# Patient Record
Sex: FEMALE | Race: OTHER | URBAN - METROPOLITAN AREA
[De-identification: names, ages, dates, MRNs, and addresses within clinical notes are randomized per-mention and may not be internally consistent; named-entity substitution may affect disease eponyms.]

---

## 2024-05-21 ENCOUNTER — HOSPITAL ENCOUNTER (OUTPATIENT)
Age: 29
Discharge: HOME | End: 2024-05-21

## 2024-05-21 VITALS
RESPIRATION RATE: 18 BRPM | OXYGEN SATURATION: 100 % | HEART RATE: 108 BPM | TEMPERATURE: 99 F | DIASTOLIC BLOOD PRESSURE: 71 MMHG | SYSTOLIC BLOOD PRESSURE: 110 MMHG

## 2024-05-21 DIAGNOSIS — J02.0 STREP PHARYNGITIS: Primary | ICD-10-CM

## 2024-05-21 LAB — S PYO AG THROAT QL: POSITIVE

## 2024-05-21 PROCEDURE — 87880 STREP A ASSAY W/OPTIC: CPT | Performed by: PHYSICIAN ASSISTANT

## 2024-05-21 PROCEDURE — 99203 OFFICE O/P NEW LOW 30 MIN: CPT | Performed by: PHYSICIAN ASSISTANT

## 2024-05-21 RX ORDER — AMOXICILLIN 875 MG/1
875 TABLET, COATED ORAL 2 TIMES DAILY
Qty: 20 TABLET | Refills: 0 | Status: SHIPPED | OUTPATIENT
Start: 2024-05-21 | End: 2024-05-31

## 2024-05-21 NOTE — ED INITIAL ASSESSMENT (HPI)
Pt c/o fever, throat discomfort, and fatigue, which started this morning. Pt vomited x1. Denies nausea at this time. At home covid test negative. History of intestinal surgeries during childhood

## 2024-05-21 NOTE — ED PROVIDER NOTES
Patient Seen in: [facility]      History     Chief Complaint   Patient presents with    Fever    Sore Throat     Stated Complaint: Abdominal Pain    Subjective:   HPI    29-year-old female presenting for evaluation of fever/chills, sore throat, vomiting.  History obtained through use of : Patient states symptom onset this a.m.  She had episode of nausea and vomiting but denies abdominal pain currently. No known sick contact. She did a home covid test which was negative.     Objective:   No pertinent past medical history.            No pertinent past surgical history.              No pertinent social history.            Review of Systems    Positive for stated complaint: Abdominal Pain  Other systems are as noted in HPI.  Constitutional and vital signs reviewed.      All other systems reviewed and negative except as noted above.    Physical Exam     ED Triage Vitals [05/21/24 1415]   /71   Pulse 115   Resp 18   Temp 99.2 °F (37.3 °C)   Temp src Temporal   SpO2 100 %   O2 Device None (Room air)       Current Vitals:   Vital Signs  BP: 110/71  Pulse: 108  Resp: 18  Temp: 99.2 °F (37.3 °C)  Temp src: Temporal    Oxygen Therapy  SpO2: 100 %  O2 Device: None (Room air)            Physical Exam  Vitals and nursing note reviewed.   Constitutional:       General: She is not in acute distress.  HENT:      Head: Normocephalic and atraumatic.      Right Ear: External ear normal.      Left Ear: External ear normal.      Nose: Nose normal.      Mouth/Throat:      Mouth: Mucous membranes are moist.      Pharynx: Uvula midline. Posterior oropharyngeal erythema present.      Tonsils: No tonsillar exudate. 0 on the right. 0 on the left.   Eyes:      Extraocular Movements: Extraocular movements intact.      Pupils: Pupils are equal, round, and reactive to light.   Cardiovascular:      Rate and Rhythm: Normal rate.   Pulmonary:      Effort: Pulmonary effort is normal.   Abdominal:      General:  Abdomen is flat.   Musculoskeletal:         General: Normal range of motion.      Cervical back: Normal range of motion.   Skin:     General: Skin is warm.   Neurological:      General: No focal deficit present.      Mental Status: She is alert and oriented to person, place, and time.   Psychiatric:         Mood and Affect: Mood normal.         Behavior: Behavior normal.               ED Course     Labs Reviewed   POCT RAPID STREP - Abnormal; Notable for the following components:       Result Value    POCT Rapid Strep Positive (*)     All other components within normal limits        29-year-old female presenting for evaluation of fever/chills, body aches, sore throat.  Patient also with 1 episode of nausea and vomiting.  Posterior oropharynx erythematous.   Ddx-viral pharyngitis, strep pharyngitis, viral illness  Patient initial heart rate 110s.  Improved during provider evaluation.  She appears in no distress.  Strep test is positive    Abdomen is non-tender and non-distended and pt has no c/o pain at this time.   Amoxicillin rx'd. Supportive care and anticipatory guidance.              MDM                                         Medical Decision Making      Disposition and Plan     Clinical Impression:  1. Strep pharyngitis         Disposition:  Discharge  5/21/2024  2:44 pm    Follow-up:  Norma Preciado MD  [address] [place] [address] [id number] [phone]          Roberta Arias MD  [address] [place] [address]  [phone]  [facility] [address]  [phone]                Medications Prescribed:  Discharge Medication List as of 5/21/2024  2:48 PM        START taking these medications    Details   amoxicillin 875 MG Oral Tab Take 1 tablet (875 mg total) by mouth 2 (two) times daily for 10 days., Normal, Disp-20 tablet, R-0

## 2025-03-28 ENCOUNTER — APPOINTMENT (OUTPATIENT)
Dept: ULTRASOUND IMAGING | Facility: HOSPITAL | Age: 30
End: 2025-03-28
Payer: COMMERCIAL

## 2025-03-28 ENCOUNTER — APPOINTMENT (OUTPATIENT)
Dept: CT IMAGING | Facility: HOSPITAL | Age: 30
End: 2025-03-28
Payer: COMMERCIAL

## 2025-03-28 ENCOUNTER — HOSPITAL ENCOUNTER (OUTPATIENT)
Age: 30
Discharge: HOME | End: 2025-03-28
Payer: COMMERCIAL

## 2025-03-28 VITALS
SYSTOLIC BLOOD PRESSURE: 125 MMHG | DIASTOLIC BLOOD PRESSURE: 79 MMHG | TEMPERATURE: 98 F | OXYGEN SATURATION: 99 % | HEART RATE: 94 BPM | RESPIRATION RATE: 18 BRPM

## 2025-03-28 DIAGNOSIS — N83.9 LESION OF LEFT OVARY: ICD-10-CM

## 2025-03-28 DIAGNOSIS — N70.11 HYDROSALPINX: ICD-10-CM

## 2025-03-28 DIAGNOSIS — K21.00 GASTROESOPHAGEAL REFLUX DISEASE WITH ESOPHAGITIS WITHOUT HEMORRHAGE: Primary | ICD-10-CM

## 2025-03-28 DIAGNOSIS — R10.32 LLQ PAIN: ICD-10-CM

## 2025-03-28 LAB
#MXD IC: 0.4 X10ˆ3/UL (ref 0.1–1)
B-HCG UR QL: NEGATIVE
BILIRUB UR QL STRIP: NEGATIVE
BUN BLD-MCNC: 7 MG/DL (ref 7–18)
CHLORIDE BLD-SCNC: 106 MMOL/L (ref 98–112)
CLARITY UR: CLEAR
CO2 BLD-SCNC: 20 MMOL/L (ref 21–32)
COLOR UR: YELLOW
CREAT BLD-MCNC: 0.6 MG/DL
EGFRCR SERPLBLD CKD-EPI 2021: 125 ML/MIN/1.73M2 (ref 60–?)
GLUCOSE BLD-MCNC: 116 MG/DL (ref 70–99)
GLUCOSE UR STRIP-MCNC: NEGATIVE MG/DL
HCT VFR BLD AUTO: 41.6 %
HCT VFR BLD CALC: 45 %
HGB BLD-MCNC: 14.1 G/DL
HGB UR QL STRIP: NEGATIVE
ISTAT IONIZED CALCIUM FOR CHEM 8: 1.2 MMOL/L (ref 1.12–1.32)
KETONES UR STRIP-MCNC: NEGATIVE MG/DL
LEUKOCYTE ESTERASE UR QL STRIP: NEGATIVE
LYMPHOCYTES # BLD AUTO: 1.8 X10ˆ3/UL (ref 1–4)
LYMPHOCYTES NFR BLD AUTO: 26.2 %
MCH RBC QN AUTO: 29.4 PG (ref 26–34)
MCHC RBC AUTO-ENTMCNC: 33.9 G/DL (ref 31–37)
MCV RBC AUTO: 86.7 FL (ref 80–100)
MIXED CELL %: 6.5 %
NEUTROPHILS # BLD AUTO: 4.7 X10ˆ3/UL (ref 1.5–7.7)
NEUTROPHILS NFR BLD AUTO: 67.3 %
NITRITE UR QL STRIP: NEGATIVE
PH UR STRIP: 8.5 [PH]
PLATELET # BLD AUTO: 457 X10ˆ3/UL (ref 150–450)
POTASSIUM BLD-SCNC: 3.7 MMOL/L (ref 3.6–5.1)
PROT UR STRIP-MCNC: NEGATIVE MG/DL
RBC # BLD AUTO: 4.8 X10ˆ6/UL
SODIUM BLD-SCNC: 138 MMOL/L (ref 136–145)
SP GR UR STRIP: 1.01
UROBILINOGEN UR STRIP-ACNC: <2 MG/DL
WBC # BLD AUTO: 6.9 X10ˆ3/UL (ref 4–11)

## 2025-03-28 PROCEDURE — 76856 US EXAM PELVIC COMPLETE: CPT

## 2025-03-28 PROCEDURE — 99214 OFFICE O/P EST MOD 30 MIN: CPT

## 2025-03-28 PROCEDURE — 93975 VASCULAR STUDY: CPT

## 2025-03-28 PROCEDURE — 76830 TRANSVAGINAL US NON-OB: CPT

## 2025-03-28 PROCEDURE — 81025 URINE PREGNANCY TEST: CPT

## 2025-03-28 PROCEDURE — 81002 URINALYSIS NONAUTO W/O SCOPE: CPT

## 2025-03-28 PROCEDURE — 74177 CT ABD & PELVIS W/CONTRAST: CPT

## 2025-03-28 PROCEDURE — 85025 COMPLETE CBC W/AUTO DIFF WBC: CPT

## 2025-03-28 PROCEDURE — 80047 BASIC METABLC PNL IONIZED CA: CPT

## 2025-03-28 RX ORDER — LIDOCAINE HYDROCHLORIDE 20 MG/ML
10 SOLUTION OROPHARYNGEAL ONCE
Status: COMPLETED | OUTPATIENT
Start: 2025-03-28 | End: 2025-03-28

## 2025-03-28 RX ORDER — MAGNESIUM HYDROXIDE/ALUMINUM HYDROXICE/SIMETHICONE 120; 1200; 1200 MG/30ML; MG/30ML; MG/30ML
30 SUSPENSION ORAL ONCE
Status: COMPLETED | OUTPATIENT
Start: 2025-03-28 | End: 2025-03-28

## 2025-03-28 RX ORDER — DICYCLOMINE HYDROCHLORIDE 10 MG/5ML
20 SOLUTION ORAL ONCE
Status: COMPLETED | OUTPATIENT
Start: 2025-03-28 | End: 2025-03-28

## 2025-03-28 RX ORDER — FAMOTIDINE 20 MG/1
20 TABLET, FILM COATED ORAL 2 TIMES DAILY PRN
Qty: 30 TABLET | Refills: 0 | Status: SHIPPED | OUTPATIENT
Start: 2025-03-28 | End: 2025-04-27

## 2025-03-28 NOTE — DISCHARGE INSTRUCTIONS
Shante síntomas son compatibles con reflujo.  Por favor, siga miriam dieta blanda y evite las comidas picantes.  Le envié miriam receta de Pepcid para el dolor.  Por favor, programe miriam kieran con el gastroenterólogo, andrei se indicó.    Si presenta un empeoramiento del dolor, vómitos, diarrea, fiebre, bar en las heces o cualquier otra molestia preocupante, acuda a urgencias.    De lo contrario, consulte con larry médico de cabecera.    Tiene miriam lesión en el ovario sandhya y acumulación de líquido en la trompa de Falopio. Estos son aspectos que deben ser evaluados por un ginecólogo. Por favor, programe miriam kieran con el ginecólogo, andrei se indicó.    También debe iniciar miriam consulta con un médico de atención primaria.    Si presenta un empeoramiento caitlin del dolor o cualquier otra molestia preocupante, acuda a urgencias.        Your symptoms are consistent with reflux.    Please eat a bland diet and stay away from eating spicy foods.   I sent a prescription for pepcid for the pain.    Please make an appointment to see the GI specialist as discussed.    If you develop any worsening pain, vomiting, diarrhea, fever, blood in the stool or any other concerning complaints that they go to the emergency department.    Otherwise follow-up with your PCP.    You have a lesion on your left ovary and a collection of fluid in the fallopian tube.  These are things that need to be evaluated by a gynecologist.  Please make an appointment with the gynecologist as discussed.     You should also establish care with a primary care doctor.     If you develop any acutely worsening pain or any other worsening or concerning complaints please go to the emergency room.

## 2025-03-28 NOTE — ED PROVIDER NOTES
Patient Seen in: [facility]      History     Chief Complaint   Patient presents with    Epigastric Pain     Stated Complaint: Dizziness; Stomach/back pain; Fever; Difficuilty breathing  Subjective:   Yina is a 30 year old female presenting to the immediate care complaining of epigastric pain for the past 3 days.  Patient states that she has not had any nausea, vomiting, diarrhea or constipation.  Patient has not had a measured fever but has had some subjective chills over the past couple of days.  Denies any urinary symptoms.  Denies any vaginal bleeding or discharge.  Patient states that she has also had some mild anxiety symptoms over the past few days.  States that 2 days ago she had some anxiety that was accompanied by minor palpitations and shortness of breath.  No chest pain.  Palpitations lasted only a short time and did not return.  She has not had any palpitations since.  She states that she has been eating and drinking well and is well-hydrated.  She denies any other concerns or complaints.        Objective:   No pertinent past medical history.          No pertinent past surgical history.            No pertinent social history.          Review of Systems    Positive for stated complaint: Epigastric Pain    Other systems are as noted in HPI.  Constitutional and vital signs reviewed.      All other systems reviewed and negative except as noted above.    Physical Exam     ED Triage Vitals [03/28/25 1119]   /79   Pulse 94   Resp 18   Temp 98.3 °F (36.8 °C)   Temp src Oral   SpO2 99 %   O2 Device None (Room air)     Current:/79   Pulse 94   Temp 98.3 °F (36.8 °C) (Oral)   Resp 18   LMP 05/21/2024   SpO2 99%     Physical Exam  Vitals and nursing note reviewed.   Constitutional:       General: She is not in acute distress.     Appearance: Normal appearance. She is not ill-appearing, toxic-appearing or diaphoretic.   HENT:      Head: Normocephalic.   Cardiovascular:      Rate and  Rhythm: Normal rate.   Pulmonary:      Effort: Pulmonary effort is normal. No respiratory distress.   Abdominal:      General: Abdomen is flat. A surgical scar is present. Bowel sounds are normal. There is no distension.      Palpations: Abdomen is soft. There is no mass.      Tenderness: There is abdominal tenderness in the epigastric area, suprapubic area and left lower quadrant. There is guarding. There is no right CVA tenderness, left CVA tenderness or rebound.      Hernia: No hernia is present.      Comments: Multiple surgical scars to lower abdomen   Musculoskeletal:         General: Normal range of motion.      Cervical back: Normal range of motion.   Skin:     General: Skin is warm and dry.      Capillary Refill: Capillary refill takes less than 2 seconds.   Neurological:      General: No focal deficit present.      Mental Status: She is alert and oriented to person, place, and time.   Psychiatric:         Mood and Affect: Mood normal.         Behavior: Behavior normal.         Thought Content: Thought content normal.         Judgment: Judgment normal.         ED Course   Radiology:    US PELVIS EV W DOPPLER (CPT=76856/45486/02346)   Final Result   PROCEDURE: US PELVIS EV W DOPPLER (CPT=76856/42597/70390)       COMPARISON: [facility], CT ABDOMEN + PELVIS (CONTRAST    ONLY) (CPT=74177), 3/28/2025, 1:38 PM.       INDICATIONS: Dizziness; stomach/back pain; fever; difficulty breathing.       TECHNIQUE: Pelvic ultrasound using transabdominal and transvaginal    technique.  A transvaginal scan was performed for improved tissue    characterization of the uterus, enhanced visualization of the endometrial    canal, and ovarian/adnexal evaluation.       FINDINGS:      UTERUS:       SIZE:  The uterus measures 9.8 x 3.9 x 5.9 cm.   ENDOMETRIUM: Endometrial thickness measures up to 2.7 cm. No fluid or mass    in the endometrial canal. On color Doppler interrogation, there is no    discernible  hypervascularity.   MYOMETRIUM: Normal echogenicity and homogeneous in echotexture without    masses.         OVARIES AND ADNEXA:    RIGHT:   The right ovary measures 3.4 x 2.7 x 2.5 cm and demonstrates a    1.6 x 1.5 x 1.3 cm mildly complex-appearing lesion. Additionally in the    right adnexal region, there is a tubular fluid-filled structure which    appears separate from the ovary. Color    Doppler flow is demonstrated. Biphasic waveforms are apparent on spectral    Doppler interrogation.   LEFT:   The left ovary (only seen on transabdominal imaging) measures 9.1    x 4.3 x 5.6 cm and demonstrates a multi-septated complex-appearing cystic    lesion measuring up to 6.8 x 4.1 x 4.3 cm. Flow is present on color    Doppler interrogation. Spectral    Doppler analysis reveals biphasic waveforms.       CUL-DE-SAC:   No free fluid or mass.     OTHER: Limited sonographic views of the bladder are grossly unremarkable.                            =====   CONCLUSION:    1. No evidence of ovarian torsion.       2. Complex multi-septated left ovarian lesion. Although this may be    functional/physiologic, gynecological follow-up and possible MRI of the    pelvis with and without contrast could be considered on an outpatient    nonemergent basis for further assessment.       3. Partially visualized mildly complex right ovarian cyst.         4. Additionally, there is suspected right hydrosalpinx.       5. Lesser incidental findings as above.               Dictated by (CST): Bao Lizama MD on 3/28/2025 at 3:56 PM        Finalized by (CST): aBo Lizama MD on 3/28/2025 at 4:00 PM               CT ABDOMEN+PELVIS(CONTRAST ONLY)(CPT=74177)   Final Result   PROCEDURE: CT ABDOMEN + PELVIS (CONTRAST ONLY) (CPT=74177)       COMPARISON: None.       INDICATIONS: LLQ tenderness, multiple intestinal surgeries.       TECHNIQUE: CT images of the abdomen and pelvis were obtained with    non-ionic intravenous contrast material.  Automated  exposure control for    dose reduction was used. Adjustment of the mA and/or kV was done based on    the patient's size. Use of iterative    reconstruction technique for dose reduction was used.  Dose information is    transmitted to the ACR (American College of Radiology) NRDR (National    Radiology Data Registry) which includes the Dose Index Registry.       FINDINGS:    LIVER: Diffuse low attenuation seen throughout the liver compatible with    fatty infiltration.   GALLBLADDER: Normal wall thickness.  No pericholecystic fluid.   BILIARY: No intra-or extrahepatic biliary ductal dilation.   SPLEEN: Unremarkable   PANCREAS: The pancreas enhances symmetrically. No ductal dilation.   ADRENALS: Unremarkable   KIDNEYS: The kidneys enhance symmetrically. There is no hydronephrosis.     AORTA/VASCULAR:   Normal.  No aneurysm or dissection.    RETROPERITONEUM: There are scattered subcentimeter nonspecific    retroperitoneal lymph nodes.   BOWEL:  Postoperative changes of a right hemicolectomy with an ileocolonic    anastomosis. There is no dilation or wall thickening.   MESENTERY: Normal.  No mass or hernia.      PELVIS: Multiloculated and septated cysts seen within the left ovary    measuring up to 3.5 x 5.3 cm. Bladder wall thickening likely due to under    distention.  Within the right lower pelvis, there is a tubular structure    measuring 4.7 x 2.4 by 4.1 cm without    adjacent inflammation.     BONES:   No significant bony lesion or fracture.   LUNG BASES: No visible pulmonary or pleural disease.                   =====   CONCLUSION:        Enlarged left ovary containing a 5.3 cm multi septated cyst.       Tubular structure within the right lower pelvis suspicious for    hydrosalpinx.       Pelvic ultrasound recommended for further evaluation.       No obstruction                Dictated by (CST): Markie Wynn MD on 3/28/2025 at 2:17 PM        Finalized by (CST): Markie Wynn MD on 3/28/2025 at 2:20 PM                  Labs Reviewed   POCT CBC - Abnormal; Notable for the following components:       Result Value    PLT .0 (*)     All other components within normal limits   POCT ISTAT CHEM8 CARTRIDGE - Abnormal; Notable for the following components:    ISTAT Glucose 116 (*)     ISTAT TCO2 20 (*)     All other components within normal limits   [facility] POCT URINALYSIS DIPSTICK - Abnormal; Notable for the following components:    PH, Urine 8.5 (*)     All other components within normal limits   POCT PREGNANCY URINE - Normal       MDM     Medical Decision Making  Differential diagnoses reflecting the complexity of care include but are not limited to colitis, diverticulitis, GERD, surgical adhesions.    Comorbidities that add complexity to management include: Hirschsprung's disease with multiple intestinal surgeries  History obtained by an independent source was from: Patient  Patient is well appearing, non-toxic and in no acute distress.  Vital signs are stable.     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  No vaginal discharge or bleeding.  Patient was given a GI cocktail in the immediate care with relief of the epigastric pain.  Will send a prescription for Pepcid.  Recommended that the patient make an appointment to follow-up with GI specialist.    Urine pregnancy test is negative.  Urine dip unremarkable.  Labs and CT were done.  CBC and BMP were both unremarkable.  CT scan showed a possible hydrosalpinx and recommended an ultrasound.  Pelvic ultrasound was done that also saw hydrosalpinx and a complicated left ovarian lesion.  Recommended Tylenol or Motrin for pain for now until she follows up with GYN.  I discussed all of these results with the patient with a  and recommended that she make an appointment to follow-up as an outpatient with gynecologist.  Also recommended that  the patient make an appointment to follow-up with a primary care doctor.   Recommended that if the patient develop any acutely worsening pain or any other worsening or concerning complaints that she should go to the emergency department.    ED precautions discussed.  Patient (guardian) advised to follow up with PCP in 2-3 days.  Patient (guardian) agrees with this plan of care.  Patient (guardian) verbalizes understanding of discharge instructions and plan of care.  Patient discussed with Dr. Myrick on the phone who agrees with this plan.  Entire visit was conducted with  #[id number], [id number], [id number]      Amount and/or Complexity of Data Reviewed  Labs: ordered. Decision-making details documented in ED Course.  Radiology: ordered. Decision-making details documented in ED Course.    Risk  OTC drugs.  Prescription drug management.        Disposition and Plan     Clinical Impression:  1. Gastroesophageal reflux disease with esophagitis without hemorrhage    2. LLQ pain    3. Hydrosalpinx    4. Lesion of left ovary         Disposition:  Discharge  3/28/2025  4:16 pm    Follow-up:  Alma Burnette APRN  [address] [facility] [address]  [facility] [address]  [phone]          Lorne Russell MD  [address] [facility] [address]  [facility] [address]  [phone]          Esau Llanes MD  [address] [place] [id number]  [phone]                Medications Prescribed:  Discharge Medication List as of 3/28/2025  4:16 PM        START taking these medications    Details   famotidine (PEPCID) 20 MG Oral Tab Take 1 tablet (20 mg total) by mouth 2 (two) times daily as needed for Heartburn., Normal, Disp-30 tablet, R-0

## 2025-04-01 ENCOUNTER — HOSPITAL ENCOUNTER (EMERGENCY)
Facility: HOSPITAL | Age: 30
Discharge: HOME | End: 2025-04-01
Attending: EMERGENCY MEDICINE
Payer: COMMERCIAL

## 2025-04-01 ENCOUNTER — APPOINTMENT (OUTPATIENT)
Dept: CT IMAGING | Facility: HOSPITAL | Age: 30
End: 2025-04-01
Attending: EMERGENCY MEDICINE
Payer: COMMERCIAL

## 2025-04-01 ENCOUNTER — HOSPITAL ENCOUNTER (OUTPATIENT)
Age: 30
Discharge: ANOTHER HOSPITAL | End: 2025-04-01
Payer: COMMERCIAL

## 2025-04-01 ENCOUNTER — APPOINTMENT (OUTPATIENT)
Dept: GENERAL RADIOLOGY | Facility: HOSPITAL | Age: 30
End: 2025-04-01
Attending: EMERGENCY MEDICINE
Payer: COMMERCIAL

## 2025-04-01 VITALS
DIASTOLIC BLOOD PRESSURE: 73 MMHG | HEART RATE: 84 BPM | RESPIRATION RATE: 18 BRPM | OXYGEN SATURATION: 99 % | SYSTOLIC BLOOD PRESSURE: 131 MMHG | TEMPERATURE: 98 F

## 2025-04-01 VITALS
TEMPERATURE: 98 F | BODY MASS INDEX: 29.74 KG/M2 | SYSTOLIC BLOOD PRESSURE: 111 MMHG | DIASTOLIC BLOOD PRESSURE: 91 MMHG | HEIGHT: 64.57 IN | OXYGEN SATURATION: 99 % | RESPIRATION RATE: 25 BRPM | WEIGHT: 176.38 LBS | HEART RATE: 88 BPM

## 2025-04-01 DIAGNOSIS — R10.13 EPIGASTRIC PAIN: Primary | ICD-10-CM

## 2025-04-01 DIAGNOSIS — R07.89 CHEST PRESSURE: Primary | ICD-10-CM

## 2025-04-01 DIAGNOSIS — R10.13 EPIGASTRIC PAIN: ICD-10-CM

## 2025-04-01 LAB
ALBUMIN SERPL-MCNC: 4.6 G/DL (ref 3.2–4.8)
ALBUMIN/GLOB SERPL: 1.8 {RATIO} (ref 1–2)
ALP LIVER SERPL-CCNC: 51 U/L
ALT SERPL-CCNC: 38 U/L
ANION GAP SERPL CALC-SCNC: 9 MMOL/L (ref 0–18)
AST SERPL-CCNC: 27 U/L (ref ?–34)
B-HCG UR QL: NEGATIVE
BASOPHILS # BLD AUTO: 0.03 X10(3) UL (ref 0–0.2)
BASOPHILS NFR BLD AUTO: 0.4 %
BILIRUB SERPL-MCNC: 0.6 MG/DL (ref 0.3–1.2)
BUN BLD-MCNC: 10 MG/DL (ref 9–23)
BUN/CREAT SERPL: 14.1 (ref 10–20)
CALCIUM BLD-MCNC: 9.1 MG/DL (ref 8.7–10.4)
CHLORIDE SERPL-SCNC: 105 MMOL/L (ref 98–112)
CO2 SERPL-SCNC: 25 MMOL/L (ref 21–32)
CREAT BLD-MCNC: 0.71 MG/DL
D DIMER PPP FEU-MCNC: 0.33 UG/ML FEU (ref ?–0.5)
DEPRECATED RDW RBC AUTO: 39.4 FL (ref 35.1–46.3)
EGFRCR SERPLBLD CKD-EPI 2021: 119 ML/MIN/1.73M2 (ref 60–?)
EOSINOPHIL # BLD AUTO: 0.08 X10(3) UL (ref 0–0.7)
EOSINOPHIL NFR BLD AUTO: 1 %
ERYTHROCYTE [DISTWIDTH] IN BLOOD BY AUTOMATED COUNT: 12.1 % (ref 11–15)
GLOBULIN PLAS-MCNC: 2.6 G/DL (ref 2–3.5)
GLUCOSE BLD-MCNC: 99 MG/DL (ref 70–99)
HCT VFR BLD AUTO: 40.5 %
HGB BLD-MCNC: 13.6 G/DL
IMM GRANULOCYTES # BLD AUTO: 0.03 X10(3) UL (ref 0–1)
IMM GRANULOCYTES NFR BLD: 0.4 %
LIPASE SERPL-CCNC: 37 U/L (ref 12–53)
LYMPHOCYTES # BLD AUTO: 1.99 X10(3) UL (ref 1–4)
LYMPHOCYTES NFR BLD AUTO: 25.3 %
MCH RBC QN AUTO: 30 PG (ref 26–34)
MCHC RBC AUTO-ENTMCNC: 33.6 G/DL (ref 31–37)
MCV RBC AUTO: 89.2 FL
MONOCYTES # BLD AUTO: 0.39 X10(3) UL (ref 0.1–1)
MONOCYTES NFR BLD AUTO: 5 %
NEUTROPHILS # BLD AUTO: 5.35 X10 (3) UL (ref 1.5–7.7)
NEUTROPHILS # BLD AUTO: 5.35 X10(3) UL (ref 1.5–7.7)
NEUTROPHILS NFR BLD AUTO: 67.9 %
OSMOLALITY SERPL CALC.SUM OF ELEC: 287 MOSM/KG (ref 275–295)
PLATELET # BLD AUTO: 429 10(3)UL (ref 150–450)
POTASSIUM SERPL-SCNC: 3.7 MMOL/L (ref 3.5–5.1)
PROT SERPL-MCNC: 7.2 G/DL (ref 5.7–8.2)
RBC # BLD AUTO: 4.54 X10(6)UL
SODIUM SERPL-SCNC: 139 MMOL/L (ref 136–145)
TROPONIN I SERPL HS-MCNC: <3 NG/L
WBC # BLD AUTO: 7.9 X10(3) UL (ref 4–11)

## 2025-04-01 PROCEDURE — 36415 COLL VENOUS BLD VENIPUNCTURE: CPT

## 2025-04-01 PROCEDURE — 99285 EMERGENCY DEPT VISIT HI MDM: CPT

## 2025-04-01 PROCEDURE — 71045 X-RAY EXAM CHEST 1 VIEW: CPT | Performed by: EMERGENCY MEDICINE

## 2025-04-01 PROCEDURE — 83690 ASSAY OF LIPASE: CPT | Performed by: EMERGENCY MEDICINE

## 2025-04-01 PROCEDURE — 93010 ELECTROCARDIOGRAM REPORT: CPT

## 2025-04-01 PROCEDURE — 84484 ASSAY OF TROPONIN QUANT: CPT | Performed by: EMERGENCY MEDICINE

## 2025-04-01 PROCEDURE — 99215 OFFICE O/P EST HI 40 MIN: CPT | Performed by: PHYSICIAN ASSISTANT

## 2025-04-01 PROCEDURE — 93005 ELECTROCARDIOGRAM TRACING: CPT

## 2025-04-01 PROCEDURE — 74177 CT ABD & PELVIS W/CONTRAST: CPT | Performed by: EMERGENCY MEDICINE

## 2025-04-01 PROCEDURE — 99284 EMERGENCY DEPT VISIT MOD MDM: CPT

## 2025-04-01 PROCEDURE — 81025 URINE PREGNANCY TEST: CPT

## 2025-04-01 PROCEDURE — 85379 FIBRIN DEGRADATION QUANT: CPT | Performed by: EMERGENCY MEDICINE

## 2025-04-01 PROCEDURE — 85025 COMPLETE CBC W/AUTO DIFF WBC: CPT | Performed by: EMERGENCY MEDICINE

## 2025-04-01 PROCEDURE — 80053 COMPREHEN METABOLIC PANEL: CPT | Performed by: EMERGENCY MEDICINE

## 2025-04-01 RX ORDER — OMEPRAZOLE 40 MG/1
40 CAPSULE, DELAYED RELEASE ORAL DAILY
Qty: 30 CAPSULE | Refills: 0 | Status: SHIPPED | OUTPATIENT
Start: 2025-04-01 | End: 2025-05-01

## 2025-04-01 NOTE — ED PROVIDER NOTES
Chief Complaint   Patient presents with    Abdominal Pain       HPI:     Yina Hollingsworth is a 30 year old female who presents for evaluation of epigastric pain over the last 4 days radiating into the mid upper back, notes seen through her department 3 days ago including blood work CT and ultrasound discharged home with H2 antagonist for supportive GERD.  Findings also included a septated cyst left ovary with normal waveforms.  Patient states pain has not been improving over this time more localized along the epigastric area.  Notes abdominal surgical history includes Hirschsprung's complications @ birth with revision surgery at age 7.  Notes bowel patterns and voiding normal.  Denies history of gallstones or pancreatitis.  Denies associated fevers chills headache dizziness neck pain chest pain shortness of breath vomiting diarrhea dysuria hematuria medic Juhi vaginal bleeding or discharge.      PFSH    PFS asessment screens reviewed and agree.  Nurses notes reviewed I agree with documentation.    History reviewed. No pertinent family history.  Family history reviewed with patient/caregiver and is not pertinent to presenting problem.  Social History     Socioeconomic History    Marital status: Single     Spouse name: Not on file    Number of children: Not on file    Years of education: Not on file    Highest education level: Not on file   Occupational History    Not on file   Tobacco Use    Smoking status: Unknown    Smokeless tobacco: Not on file   Substance and Sexual Activity    Alcohol use: Not on file    Drug use: Not on file    Sexual activity: Not on file   Other Topics Concern    Not on file   Social History Narrative    Not on file     Social Drivers of Health     Food Insecurity: Not on file   Transportation Needs: Not on file   Housing Stability: Not on file         ROS:   Positive for stated complaint: Abdominal pain.  All other systems reviewed and negative except as noted above.  Constitutional  and Vital Signs Reviewed.      Physical Exam:     Findings:    /73   Pulse 84   Temp 98.2 °F (36.8 °C) (Oral)   Resp 18   LMP 05/21/2024   SpO2 99%   GENERAL: well developed, well nourished, well hydrated, no distress  SKIN: good skin turgor, no obvious rashes  NECK: supple, no adenopathy  CARDIO: RRR without murmur  EXTREMITIES: no cyanosis or edema. PETE without difficulty  GI: Mild epigastric tenderness, negative Wright.  Negative Scotts.  Negative rebound.  No adnexal or CVA tenderness bilaterally.  Normal bowel sounds  HEAD: normocephalic, atraumatic  EYES: sclera non icteric bilateral, conjunctiva clear  EARS: TMs clear bilaterally. Canals clear.  NOSE: nasal turbinates: pink, normal mucosa  THROAT: clear, without exudates, uvula midline, and airway patent  LUNGS: clear to auscultation bilaterally; no rales, rhonchi, or wheezes  NEURO: No focal deficits  PSYCH: Alert and oriented x3.  Answering questions appropriately.  Mood appropriate.    MDM/Assessment/Plan:   Orders for this encounter:    No orders of the defined types were placed in this encounter.      Labs performed this visit:  No results found for this or any previous visit (from the past 10 hours).    MDM:  Patient instructed via translation and limitations our facility based on previous workup and assessment and examination today, patient agreeable to be further evaluated through the ER by patient preference versus outpatient referrals based on ongoing concerns and issues.  Dr. Tran notified, patient agrees with n.p.o. status by private vehicle until reassessment.    Diagnosis:    ICD-10-CM    1. Epigastric pain  R10.13           All results reviewed and discussed with patient.  See AVS for detailed discharge instructions for your condition today.    Follow Up with:  No follow-up provider specified.

## 2025-04-01 NOTE — ED INITIAL ASSESSMENT (HPI)
Pt reports sob and back pain that started last week. Pt states that the pain is getting worse today. Pt reports chest pressure. Pt reports no fever/N/V.

## 2025-04-01 NOTE — DISCHARGE INSTRUCTIONS
Take omeprazole as prescribed.  Follow-up with your primary physician.  Also follow-up with your gynecologist as discussed.  Return to the emergency department if increased pain, increased difficulty breathing, or other new symptoms develop.

## 2025-04-01 NOTE — ED PROVIDER NOTES
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  She reports intermittent symptoms of palpitations and chest pressure to her anterior chest with radiation to the left upper back.  She also reports some upper abdominal discomfort, nausea, and subjective fever.  She reports symptoms over the past week.  She initially presented to an immediate care today and was sent to the ED for evaluation.    Objective:     History reviewed. No pertinent past medical history.           Past Surgical History:   Procedure Laterality Date    Tonsillectomy                  Social History     Socioeconomic History    Marital status: Single   Tobacco Use    Smoking status: Unknown   Vaping Use    Vaping status: Some Days   Substance and Sexual Activity    Alcohol use: Yes     Comment: social    Drug use: Never                  Physical Exam     ED Triage Vitals [04/01/25 1322]   /80   Pulse 92   Resp 20   Temp 98.3 °F (36.8 °C)   Temp src Temporal   SpO2 100 %   O2 Device None (Room air)       Current Vitals:   Vital Signs  BP: 125/80  Pulse: 92  Resp: 20  Temp: 98.3 °F (36.8 °C)  Temp src: Temporal    Oxygen Therapy  SpO2: 100 %  O2 Device: None (Room air)        Physical Exam    General Appearance: alert, no distress  Eyes: pupils equal and round no pallor or injection  ENT, Mouth: mucous membranes moist  Respiratory: there are no retractions, lungs are clear to auscultation  Cardiovascular: regular rate and rhythm  Gastrointestinal:  abdomen is soft with mild tenderness across the upper abdomen, no  masses, bowel sounds normal  Neurological: Speech normal.  Moving extremities x 4.  Skin: warm and dry, no rashes.  Musculoskeletal: neck is supple non tender        Extremities are symmetrical, full range of motion no leg edema or tenderness noted.  Psychiatric: patient is oriented X 3, there is no agitation    ED Course     Labs Reviewed   COMP METABOLIC PANEL (14) - Normal   LIPASE - Normal   TROPONIN I HIGH SENSITIVITY - Normal   D-DIMER - Normal   POCT PREGNANCY URINE - Normal   CBC WITH DIFFERENTIAL WITH PLATELET   RAINBOW DRAW LAVENDER   RAINBOW DRAW LIGHT GREEN   UA HOLD     EKG    Rate, intervals and axes as noted on EKG Report.  Rate: 89  Rhythm: Sinus Rhythm  Axis: Normal  Reading: Normal EKG                     MDM      Chest x-ray was reviewed independently me.  No pneumonia or pneumothorax noted.  Lab, EKG, and CT results noted.  No acute findings to explain the patient's symptoms.  She does have an ovarian cyst noted on CT imaging which the patient is aware of and has a follow-up appointment scheduled with gynecology.  Will discharge home with a prescription for a PPI and recommendations to follow-up with her primary physician for further evaluation and treatment.  She is advised to return if worsening symptoms develop.        Medical Decision Making      Disposition and Plan     Clinical Impression:  1. Chest pressure    2. Epigastric pain         Disposition:  Discharge  4/1/2025  3:59 pm    Follow-up:  own physician    Follow up            Medications Prescribed:  Current Discharge Medication List        START taking these medications    Details   Omeprazole 40 MG Oral Capsule Delayed Release Take 1 capsule (40 mg total) by mouth daily.  Qty: 30 capsule, Refills: 0                 Supplementary Documentation:

## 2025-04-02 LAB
ATRIAL RATE: 89 BPM
P AXIS: 52 DEGREES
P-R INTERVAL: 156 MS
Q-T INTERVAL: 372 MS
QRS DURATION: 86 MS
QTC CALCULATION (BEZET): 452 MS
R AXIS: 13 DEGREES
T AXIS: 21 DEGREES
VENTRICULAR RATE: 89 BPM

## 2025-04-08 ENCOUNTER — OFFICE VISIT (OUTPATIENT)
Dept: OBGYN CLINIC | Facility: CLINIC | Age: 30
End: 2025-04-08

## 2025-04-08 ENCOUNTER — LAB ENCOUNTER (OUTPATIENT)
Dept: LAB | Age: 30
End: 2025-04-08
Attending: STUDENT IN AN ORGANIZED HEALTH CARE EDUCATION/TRAINING PROGRAM
Payer: COMMERCIAL

## 2025-04-08 VITALS
SYSTOLIC BLOOD PRESSURE: 110 MMHG | HEART RATE: 85 BPM | BODY MASS INDEX: 28 KG/M2 | DIASTOLIC BLOOD PRESSURE: 67 MMHG | WEIGHT: 164 LBS

## 2025-04-08 DIAGNOSIS — Z12.4 SCREENING FOR CERVICAL CANCER: ICD-10-CM

## 2025-04-08 DIAGNOSIS — N83.202 LEFT OVARIAN CYST: ICD-10-CM

## 2025-04-08 DIAGNOSIS — Z01.419 ENCOUNTER FOR WELL WOMAN EXAM: Primary | ICD-10-CM

## 2025-04-08 DIAGNOSIS — Z11.3 SCREEN FOR STD (SEXUALLY TRANSMITTED DISEASE): ICD-10-CM

## 2025-04-08 LAB
HBV SURFACE AG SER-ACNC: <0.1 [IU]/L
HBV SURFACE AG SERPL QL IA: NONREACTIVE
HCV AB SERPL QL IA: NONREACTIVE
T PALLIDUM AB SER QL IA: NONREACTIVE

## 2025-04-08 PROCEDURE — 99385 PREV VISIT NEW AGE 18-39: CPT | Performed by: STUDENT IN AN ORGANIZED HEALTH CARE EDUCATION/TRAINING PROGRAM

## 2025-04-08 PROCEDURE — 3078F DIAST BP <80 MM HG: CPT | Performed by: STUDENT IN AN ORGANIZED HEALTH CARE EDUCATION/TRAINING PROGRAM

## 2025-04-08 PROCEDURE — 87389 HIV-1 AG W/HIV-1&-2 AB AG IA: CPT

## 2025-04-08 PROCEDURE — 3074F SYST BP LT 130 MM HG: CPT | Performed by: STUDENT IN AN ORGANIZED HEALTH CARE EDUCATION/TRAINING PROGRAM

## 2025-04-08 PROCEDURE — 36415 COLL VENOUS BLD VENIPUNCTURE: CPT

## 2025-04-08 PROCEDURE — 86803 HEPATITIS C AB TEST: CPT

## 2025-04-08 PROCEDURE — 86780 TREPONEMA PALLIDUM: CPT

## 2025-04-08 PROCEDURE — 87340 HEPATITIS B SURFACE AG IA: CPT

## 2025-04-08 NOTE — PROGRESS NOTES
[facility]  Obstetrics and Gynecology  Annual  Nancy Alvarez PA-C      The following individual(s) verbally consented to be recorded using ambient AI listening technology and understand that they can each withdraw their consent to this listening technology at any point by asking the clinician to turn off or pause the recording:    Patient name: Yina Hollingsworth is a 30 year old female  presenting today for her annual well woman exam.     History of Present Illness  Yina Hollingsworth is a 30 year old female who presents for her annual well woman exam and fertility concerns..    She has been attempting to conceive for over a year without success. Her menstrual cycles are regular, and she uses an application to track her cycles and identify fertile days. Despite timing intercourse during these fertile windows, she has not become pregnant. She is not using any contraceptives and has been with the same partner for nearly three years. No abnormal vaginal discharge, itching, irritation, or foul odor. No blood in urine or pain during urination. Breast pain occurs only around her menstrual period.    A cyst on her left ovary was identified during a CT scan on 25 measuring 8cm as well as a tubular cystic structure in the right hemipelvis that is a possible bladder diverticulum. She experiences significant pain during her menstrual periods, which radiates to her legs and hips.    She has a history of intestinal surgeries due to Hirschsprung's disease, with the first surgery at three months old and the last at [age] years old. She is concerned about the potential impact of these surgeries on her fertility.    Her current medications include omeprazole, taken daily for reflux, and famotidine as needed. She has no known drug allergies. She has a history of smoking but has quit. She does not consume alcohol or use recreational drugs.    Patient's last menstrual period was 2024.      Pap:   Contraception:None  Gardasil: not completed.    OBSTETRICS HISTORY:     OB History    Para Term  AB Living   0 0 0 0 0 0   SAB IAB Ectopic Multiple Live Births   0 0 0 0 0       GYNE HISTORY:     Menarche: 12y/o (2025  1:54 PM)  Period Cycle (Days): 28-30days (2025  1:54 PM)  Period Duration (Days): 5-6days (2025  1:54 PM)  Period Flow: Heavy (2025  1:54 PM)  Use of Birth Control (if yes, specify type): None (2025  1:54 PM)  Hx Prior Abnormal Pap: No (2025  1:54 PM)      History   Sexual Activity    Sexual activity: Yes    Partners: Male            No data to display                  MEDICAL HISTORY:     No past medical history on file.   Past Surgical History:   Procedure Laterality Date    Colon surgery      Tonsillectomy         SOCIAL HISTORY:     Tobacco Use: Not on file       Depression Screening:   Depression Screening (PHQ-2/PHQ-9): Over the LAST 2 WEEKS   Little interest or pleasure in doing things (over the last two weeks)?: Not at all    Feeling down, depressed, or hopeless (over the last two weeks)?: Not at all    PHQ-2 SCORE: 0          FAMILY HISTORY:     History reviewed. No pertinent family history.    MEDICATIONS:       Current Outpatient Medications:     Omeprazole 40 MG Oral Capsule Delayed Release, Take 1 capsule (40 mg total) by mouth daily., Disp: 30 capsule, Rfl: 0    famotidine (PEPCID) 20 MG Oral Tab, Take 1 tablet (20 mg total) by mouth 2 (two) times daily as needed for Heartburn., Disp: 30 tablet, Rfl: 0    ALLERGIES:     Allergies[1]    REVIEW OF SYSTEMS:     Review of Systems   Constitutional:  Negative for chills, fever and unexpected weight change.   Respiratory: Negative.     Cardiovascular: Negative.    Gastrointestinal:  Negative for abdominal pain, constipation, diarrhea and nausea.   Genitourinary:  Negative for dyspareunia, dysuria, genital sores, hematuria, menstrual problem, pelvic pain, vaginal bleeding, vaginal discharge  and vaginal pain.   Musculoskeletal: Negative.    Skin: Negative.    Neurological: Negative.    Hematological: Negative.    Psychiatric/Behavioral: Negative.           PHYSICAL EXAM:     Vitals:    04/08/25 1352   BP: 110/67   Pulse: 85   Weight: 164 lb (74.4 kg)       Body mass index is 27.66 kg/m².     Chaperone offered, pt accepted. Chaperone Scarlett Clement MA    Constitutional: well developed, well nourished  Psychiatric:  Oriented to time, place, person and situation. Appropriate mood and affect  Head/Face: normocephalic  Neck/Thyroid: thyroid symmetric, no thyromegaly, no nodules, no adenopathy  Lymphatic:no abnormal supraclavicular or axillary adenopathy is noted  Breast: normal without palpable masses, tenderness, asymmetry, nipple discharge, nipple retraction or skin changes  Abdomen:  soft, nontender, nondistended, no masses  Skin/Hair: no unusual rashes or bruises  Extremities: no edema, no cyanosis    Pelvic Exam:  External Genitalia: normal appearance, hair distribution, and no lesions  Urethral Meatus:  normal in size, location, without lesions and prolapse  Bladder:  No fullness, masses or tenderness  Vagina:  Normal appearance without lesions, no abnormal discharge  Cervix:  Normal without tenderness on motion  Uterus: normal in size, contour, position, mobility, without tenderness  Adnexa: normal without masses or tenderness  Perineum: normal  Anus: no hemorroids       ASSESMENT & PLAN:     Assessment & Plan  Infertility  Infertility persists despite regular cycles and lifestyle modifications. Referral to a fertility specialist is necessary for further evaluation.  - Refer to fertility specialist for further evaluation and testing.  - Advise on timing intercourse during ovulation.  - Recommend prenatal vitamins.  - Suggest using ovulation tests.    Ovarian cyst  8 cm left ovarian cyst identified. Risk of torsion and potential surgical intervention discussed.  - Order pelvic ultrasound to assess the  size and characteristics of the ovarian cyst.  - Advise on signs of ovarian torsion and the need for emergency care if severe pain occurs.    Sexually transmitted infection screening  STI screening requested.  - Perform STI screening tests.    Routine cervical cancer screening  Due for Pap smear. Agreed to test during visit.  - Perform Pap smear.      SUMMARY:  Pap: Next cotest 3-5 years per ASCCP guidelines.  BCM:  None  STD screening: GC/Chl/Trich/HepB/HepC/HIV/RPR, condoms encouraged  Mammogram: n/a -- once 40 yrs old  HM updated    ORDERS:     Orders Placed This Encounter   Procedures    Hpv Dna  High Risk , Thin Prep Collect    HCV Antibody    Hepatitis B Surface Antigen    HIV AG AB Combo    T PALLIDUM SCREENING CASCADE    Trichomonas vaginalis, ANGELITO (Vaginal/Cervical)    ThinPrep PAP Smear    Chlamydia/Gc Amplification     PRESCRIPTIONS:     Requested Prescriptions      No prescriptions requested or ordered in this encounter     IMAGING/ REFERRALS:    US PELVIS (TRANSABDOMINAL AND TRANSVAGINAL) (CPT=76856/99576)   FOLLOW-UP     No follow-ups on file.    ANGELICA ANDERSON PA-C  2:09 PM  4/8/2025    Note to patient and family:  The 21st Century Cures Act makes medical notes available to patients in the interest of transparency.  However, please be advised that this is a medical document.  It is intended as a peer to peer communication.  It is written in medical language and may contain abbreviations or verbiage that are technical and unfamiliar.  It may appear blunt or direct.  Medical documents are intended to carry relevant information, facts as evident, and the clinical opinion of the practitioner.         [1]   Allergies  Allergen Reactions    Caffeine ANXIETY

## 2025-04-08 NOTE — PATIENT INSTRUCTIONS
The following are options for reproductive endocrinology consultations.    [facility] [place]  Dr. Ilda Anthony M.D., MSCI and Dr. Kelton Kerns M.D., M.P.H.  [address] [address]  [phone]  [phone]    [facility]  Dr. John Coffey and Dr. Cedric Gaytan  [address] [place] [address]  Tel: [phone]  Fax: [phone]    IVF1  Maurisio Newman MD  [address] [place] [address]  Phone: [phone]

## 2025-04-09 LAB
C TRACH DNA SPEC QL NAA+PROBE: NEGATIVE
HPV E6+E7 MRNA CVX QL NAA+PROBE: NEGATIVE
N GONORRHOEA DNA SPEC QL NAA+PROBE: NEGATIVE
T VAGINALIS RRNA SPEC QL NAA+PROBE: NEGATIVE

## 2025-04-10 ENCOUNTER — TELEPHONE (OUTPATIENT)
Dept: OBGYN CLINIC | Facility: CLINIC | Age: 30
End: 2025-04-10

## 2025-04-10 RX ORDER — METRONIDAZOLE 7.5 MG/G
1 GEL VAGINAL NIGHTLY
Qty: 70 G | Refills: 0 | Status: SHIPPED | OUTPATIENT
Start: 2025-04-10 | End: 2025-04-15

## 2025-04-10 RX ORDER — METRONIDAZOLE 7.5 MG/G
1 GEL VAGINAL NIGHTLY
Qty: 70 G | Refills: 0 | Status: SHIPPED | OUTPATIENT
Start: 2025-04-10 | End: 2025-04-10

## 2025-04-10 NOTE — TELEPHONE ENCOUNTER
Patient verified name and     Patient informed of results and recommendations. Wishes to proceed with Metrogel. Prescription sent. Patient voiced understanding and agreed.

## 2025-04-10 NOTE — TELEPHONE ENCOUNTER
----- Message from NANCY ANDERSON sent at 4/10/2025 11:20 AM CDT -----  Pap smear normal, HPV negative. Shows possible bacterial vaginosis, if patient would like treatment we can send prescription for Flagyl or Metrogel. Repeat in 3-5 years per ASCCP guidelines. Please   notify patient, thanks!  ----- Message -----  From: Lab, Background User  Sent: 4/9/2025   1:04 PM CDT  To: Nancy Anderson PA-C

## 2025-04-16 ENCOUNTER — EKG ENCOUNTER (OUTPATIENT)
Dept: LAB | Age: 30
End: 2025-04-16
Attending: STUDENT IN AN ORGANIZED HEALTH CARE EDUCATION/TRAINING PROGRAM
Payer: COMMERCIAL

## 2025-04-16 ENCOUNTER — OFFICE VISIT (OUTPATIENT)
Dept: INTERNAL MEDICINE CLINIC | Facility: CLINIC | Age: 30
End: 2025-04-16

## 2025-04-16 VITALS
SYSTOLIC BLOOD PRESSURE: 106 MMHG | OXYGEN SATURATION: 98 % | HEART RATE: 87 BPM | BODY MASS INDEX: 27.66 KG/M2 | WEIGHT: 164 LBS | DIASTOLIC BLOOD PRESSURE: 68 MMHG | HEIGHT: 64.57 IN

## 2025-04-16 DIAGNOSIS — Z00.00 ANNUAL PHYSICAL EXAM: ICD-10-CM

## 2025-04-16 DIAGNOSIS — K21.9 GASTROESOPHAGEAL REFLUX DISEASE WITHOUT ESOPHAGITIS: ICD-10-CM

## 2025-04-16 DIAGNOSIS — Z13.0 SCREENING FOR ENDOCRINE, METABOLIC AND IMMUNITY DISORDER: ICD-10-CM

## 2025-04-16 DIAGNOSIS — Z13.228 SCREENING FOR ENDOCRINE, METABOLIC AND IMMUNITY DISORDER: ICD-10-CM

## 2025-04-16 DIAGNOSIS — Z82.49 FAMILY HISTORY OF EARLY CAD: ICD-10-CM

## 2025-04-16 DIAGNOSIS — Z87.738 HISTORY OF HIRSCHSPRUNG DISEASE: ICD-10-CM

## 2025-04-16 DIAGNOSIS — K59.09 OTHER CONSTIPATION: ICD-10-CM

## 2025-04-16 DIAGNOSIS — R10.13 EPIGASTRIC PAIN: ICD-10-CM

## 2025-04-16 DIAGNOSIS — Z90.49 H/O RIGHT HEMICOLECTOMY: ICD-10-CM

## 2025-04-16 DIAGNOSIS — E55.9 VITAMIN D DEFICIENCY: ICD-10-CM

## 2025-04-16 DIAGNOSIS — Z13.29 SCREENING FOR ENDOCRINE, METABOLIC AND IMMUNITY DISORDER: ICD-10-CM

## 2025-04-16 DIAGNOSIS — E83.42 HYPOMAGNESEMIA: ICD-10-CM

## 2025-04-16 DIAGNOSIS — Z00.00 ANNUAL PHYSICAL EXAM: Primary | ICD-10-CM

## 2025-04-16 DIAGNOSIS — N83.202 CYST OF LEFT OVARY: ICD-10-CM

## 2025-04-16 LAB
ALBUMIN SERPL-MCNC: 4.9 G/DL (ref 3.2–4.8)
ALBUMIN/GLOB SERPL: 2.1 {RATIO} (ref 1–2)
ALP LIVER SERPL-CCNC: 49 U/L (ref 37–98)
ALT SERPL-CCNC: 20 U/L (ref 10–49)
ANION GAP SERPL CALC-SCNC: 9 MMOL/L (ref 0–18)
AST SERPL-CCNC: 16 U/L (ref ?–34)
BASOPHILS # BLD AUTO: 0.05 X10(3) UL (ref 0–0.2)
BASOPHILS NFR BLD AUTO: 0.8 %
BILIRUB SERPL-MCNC: 0.9 MG/DL (ref 0.3–1.2)
BUN BLD-MCNC: 10 MG/DL (ref 9–23)
BUN/CREAT SERPL: 13.9 (ref 10–20)
CALCIUM BLD-MCNC: 9.9 MG/DL (ref 8.7–10.4)
CHLORIDE SERPL-SCNC: 104 MMOL/L (ref 98–112)
CHOLEST SERPL-MCNC: 155 MG/DL (ref ?–200)
CO2 SERPL-SCNC: 28 MMOL/L (ref 21–32)
CREAT BLD-MCNC: 0.72 MG/DL (ref 0.55–1.02)
DEPRECATED HBV CORE AB SER IA-ACNC: 24 NG/ML (ref 50–306)
DEPRECATED RDW RBC AUTO: 39.9 FL (ref 35.1–46.3)
EGFRCR SERPLBLD CKD-EPI 2021: 117 ML/MIN/1.73M2 (ref 60–?)
EOSINOPHIL # BLD AUTO: 0.22 X10(3) UL (ref 0–0.7)
EOSINOPHIL NFR BLD AUTO: 3.3 %
ERYTHROCYTE [DISTWIDTH] IN BLOOD BY AUTOMATED COUNT: 12.2 % (ref 11–15)
EST. AVERAGE GLUCOSE BLD GHB EST-MCNC: 105 MG/DL (ref 68–126)
FASTING PATIENT LIPID ANSWER: NO
FASTING STATUS PATIENT QL REPORTED: NO
FOLATE SERPL-MCNC: 17.2 NG/ML (ref 5.4–?)
GLOBULIN PLAS-MCNC: 2.3 G/DL (ref 2–3.5)
GLUCOSE BLD-MCNC: 86 MG/DL (ref 70–99)
HBA1C MFR BLD: 5.3 % (ref ?–5.7)
HCT VFR BLD AUTO: 38.3 % (ref 35–48)
HDLC SERPL-MCNC: 50 MG/DL (ref 40–59)
HGB BLD-MCNC: 12.9 G/DL (ref 12–16)
IMM GRANULOCYTES # BLD AUTO: 0.01 X10(3) UL (ref 0–1)
IMM GRANULOCYTES NFR BLD: 0.2 %
IRON SATN MFR SERPL: 38 % (ref 15–50)
IRON SERPL-MCNC: 125 UG/DL (ref 50–170)
LDLC SERPL CALC-MCNC: 91 MG/DL (ref ?–100)
LYMPHOCYTES # BLD AUTO: 2.29 X10(3) UL (ref 1–4)
LYMPHOCYTES NFR BLD AUTO: 34.4 %
MAGNESIUM SERPL-MCNC: 2.1 MG/DL (ref 1.6–2.6)
MCH RBC QN AUTO: 30.4 PG (ref 26–34)
MCHC RBC AUTO-ENTMCNC: 33.7 G/DL (ref 31–37)
MCV RBC AUTO: 90.1 FL (ref 80–100)
MONOCYTES # BLD AUTO: 0.41 X10(3) UL (ref 0.1–1)
MONOCYTES NFR BLD AUTO: 6.2 %
NEUTROPHILS # BLD AUTO: 3.67 X10 (3) UL (ref 1.5–7.7)
NEUTROPHILS # BLD AUTO: 3.67 X10(3) UL (ref 1.5–7.7)
NEUTROPHILS NFR BLD AUTO: 55.1 %
NONHDLC SERPL-MCNC: 105 MG/DL (ref ?–130)
OSMOLALITY SERPL CALC.SUM OF ELEC: 290 MOSM/KG (ref 275–295)
PLATELET # BLD AUTO: 374 10(3)UL (ref 150–450)
POTASSIUM SERPL-SCNC: 3.7 MMOL/L (ref 3.5–5.1)
PROT SERPL-MCNC: 7.2 G/DL (ref 5.7–8.2)
RBC # BLD AUTO: 4.25 X10(6)UL (ref 3.8–5.3)
SODIUM SERPL-SCNC: 141 MMOL/L (ref 136–145)
TOTAL IRON BINDING CAPACITY: 325 UG/DL (ref 250–425)
TRANSFERRIN SERPL-MCNC: 255 MG/DL (ref 250–380)
TRIGL SERPL-MCNC: 72 MG/DL (ref 30–149)
TSI SER-ACNC: 1.44 UIU/ML (ref 0.55–4.78)
VIT B12 SERPL-MCNC: 816 PG/ML (ref 211–911)
VIT D+METAB SERPL-MCNC: 37.1 NG/ML (ref 30–100)
VLDLC SERPL CALC-MCNC: 12 MG/DL (ref 0–30)
WBC # BLD AUTO: 6.7 X10(3) UL (ref 4–11)

## 2025-04-16 PROCEDURE — 80061 LIPID PANEL: CPT

## 2025-04-16 PROCEDURE — 83735 ASSAY OF MAGNESIUM: CPT

## 2025-04-16 PROCEDURE — 36415 COLL VENOUS BLD VENIPUNCTURE: CPT

## 2025-04-16 PROCEDURE — 84443 ASSAY THYROID STIM HORMONE: CPT

## 2025-04-16 PROCEDURE — 83036 HEMOGLOBIN GLYCOSYLATED A1C: CPT

## 2025-04-16 PROCEDURE — 83540 ASSAY OF IRON: CPT

## 2025-04-16 PROCEDURE — 82728 ASSAY OF FERRITIN: CPT

## 2025-04-16 PROCEDURE — 93010 ELECTROCARDIOGRAM REPORT: CPT | Performed by: STUDENT IN AN ORGANIZED HEALTH CARE EDUCATION/TRAINING PROGRAM

## 2025-04-16 PROCEDURE — 3008F BODY MASS INDEX DOCD: CPT | Performed by: STUDENT IN AN ORGANIZED HEALTH CARE EDUCATION/TRAINING PROGRAM

## 2025-04-16 PROCEDURE — 82746 ASSAY OF FOLIC ACID SERUM: CPT

## 2025-04-16 PROCEDURE — 84466 ASSAY OF TRANSFERRIN: CPT

## 2025-04-16 PROCEDURE — 3078F DIAST BP <80 MM HG: CPT | Performed by: STUDENT IN AN ORGANIZED HEALTH CARE EDUCATION/TRAINING PROGRAM

## 2025-04-16 PROCEDURE — 85025 COMPLETE CBC W/AUTO DIFF WBC: CPT

## 2025-04-16 PROCEDURE — 82306 VITAMIN D 25 HYDROXY: CPT

## 2025-04-16 PROCEDURE — 80053 COMPREHEN METABOLIC PANEL: CPT

## 2025-04-16 PROCEDURE — 99203 OFFICE O/P NEW LOW 30 MIN: CPT | Performed by: STUDENT IN AN ORGANIZED HEALTH CARE EDUCATION/TRAINING PROGRAM

## 2025-04-16 PROCEDURE — 93005 ELECTROCARDIOGRAM TRACING: CPT

## 2025-04-16 PROCEDURE — 82607 VITAMIN B-12: CPT

## 2025-04-16 PROCEDURE — 99385 PREV VISIT NEW AGE 18-39: CPT | Performed by: STUDENT IN AN ORGANIZED HEALTH CARE EDUCATION/TRAINING PROGRAM

## 2025-04-16 PROCEDURE — 3074F SYST BP LT 130 MM HG: CPT | Performed by: STUDENT IN AN ORGANIZED HEALTH CARE EDUCATION/TRAINING PROGRAM

## 2025-04-16 RX ORDER — BISACODYL 10 MG
10 SUPPOSITORY, RECTAL RECTAL DAILY
Qty: 30 SUPPOSITORY | Refills: 0 | Status: SHIPPED | OUTPATIENT
Start: 2025-04-16

## 2025-04-16 RX ORDER — POLYETHYLENE GLYCOL 3350 17 G/17G
17 POWDER, FOR SOLUTION ORAL DAILY
Qty: 238 G | Refills: 3 | Status: SHIPPED | OUTPATIENT
Start: 2025-04-16

## 2025-04-16 NOTE — PROGRESS NOTES
OFFICE NOTE       The following individual(s) verbally consented to be recorded using ambient AI listening technology and understand that they can each withdraw their consent to this listening technology at any point by asking the clinician to turn off or pause the recording:    Patient name: Yina Hollingsworth  Additional names:            Patient ID: Yina Hollingsworth is a 30 year old female.  Today's Date: 04/16/25  Chief Complaint: Establish Care and ER F/U (Chest pressure)      : [place] [id number]    History of Present Illness  Yina Hollingsworth is a 30-year-old female with a history of Hirschsprung's disease who presents with chest pain and constipation.    She was seen in the ER on April 1, 2025, for chest pain characterized by pressure in the chest and back, accompanied by palpitations and a sensation of choking. An EKG and chest X-ray performed in the ER were normal. A CT abdomen and pelvis with contrast showed a complex left ovarian cyst, unchanged from prior imaging, and a tubular cystic structure in the right hemipelvis likely representing a bladder diverticulum. Her CMP, CBC, and D-dimer were unremarkable.    She has a history of Hirschsprung's disease with multiple surgeries from [age] months to [age] years old, including a right hemicolectomy. Constipation began three days after her ER visit. She is taking magnesium, which helps with constipation, but is unsure if she should continue it. She has tried Dulcolax, taking only one pill due to concerns about self-prescribing. She includes vegetables in her diet, walks daily, and drinks plenty of water, but still experiences constipation. She reports left-sided abdominal pain when unable to have a bowel movement.    She is currently on omeprazole for suspected GERD, which has reduced her acid reflux symptoms. She follows a self-imposed diet to manage her symptoms, which has helped reduce acid but has led to constipation. She  inquires about the use of probiotics and enzymes to aid digestion.    She is concerned about her ability to become pregnant due to her intestinal surgeries and scarring.       Vitals:    04/16/25 1432   Weight: 164 lb (74.4 kg)   Height: 5' 4.57\" (1.64 m)     body mass index is 27.66 kg/m².  BP Readings from Last 3 Encounters:   04/08/25 110/67   04/01/25 (!) 111/91   04/01/25 131/73     The ASCVD Risk score (Rashi LANIER, et al., 2019) failed to calculate for the following reasons:    The 2019 ASCVD risk score is only valid for ages 40 to 79  Results  LABS  CMP: Unremarkable (04/01/2025)  D-dimer: Negative (04/01/2025)  CBC: Normal (04/01/2025)    RADIOLOGY  Chest x-ray: Normal (04/01/2025)  CT abdomen and pelvis with contrast: Complex left ovarian cyst, unchanged; Tubular cystic structure in the right hemipelvis likely a bladder diverticulum (04/01/2025)    DIAGNOSTIC  EKG: Normal sinus rhythm (04/01/2025)       Medications reviewed:  Current Medications[1]      Assessment & Plan    There are no diagnoses linked to this encounter.  Assessment & Plan  Chest Pain  Intermittent chest pain and pressure with palpitations, likely GERD-related. Recent ER visit with normal EKG and chest X-ray. Symptoms improved with omeprazole and dietary changes, but constipation noted.  - Repeat EKG.  - Order CBC, thyroid function tests, cholesterol, complete metabolic panel, vitamin D, B12, folate, iron levels.  - Order stool test for H. pylori.  - Advise increased fiber intake, reduced acidic foods.  - Recommend lactobacillus probiotics.  - Prescribe Miralax or Dulcolax.  - Encourage 20g fiber daily, 2-3L water.  - Follow up with GI provider.    Constipation  Chronic constipation likely related to Hirschsprung's disease and right hemicolectomy. Recent onset post-ER visit, partially relieved by magnesium citrate.  - Prescribe Miralax or Dulcolax.  - Encourage 20g fiber daily, 2-3L water.  - Recommend lactobacillus probiotics.  - Follow up  with GI provider.    Hirschsprung's Disease  Hirschsprung's disease with history of multiple surgeries including right hemicolectomy. Potential malabsorption and chronic constipation due to surgical history.  - Order vitamin B12, folate, iron levels.  - Follow up with GI provider.    Complex Left Ovarian Cyst  Complex left ovarian cyst unchanged on CT. Referred to gynecology.  - Continue follow-up with gynecology.    General Health Maintenance  Discussed impact of intestinal surgeries on fertility. Referred to gynecology for fertility evaluation. Advised to ensure bowel health first.  - Follow up with gynecology for fertility evaluation.       Follow Up: As needed/if symptoms worsen or No follow-ups on file..         Objective/ Results:   Physical Exam  Constitutional:       Appearance: She is well-developed.   Cardiovascular:      Rate and Rhythm: Normal rate and regular rhythm.      Heart sounds: Normal heart sounds.   Pulmonary:      Effort: Pulmonary effort is normal.      Breath sounds: Normal breath sounds.   Abdominal:      General: Bowel sounds are normal.      Palpations: Abdomen is soft.   Skin:     General: Skin is warm and dry.   Neurological:      Mental Status: She is alert and oriented to person, place, and time.      Deep Tendon Reflexes: Reflexes are normal and symmetric.        Physical Exam       Reviewed:    There are no active problems to display for this patient.     Allergies[2]   Short Social Hx on File[3]   Review of Systems   Constitutional: Negative.    HENT: Negative.     Eyes: Negative.    Respiratory: Negative.     Cardiovascular:  Positive for chest pain.   Gastrointestinal:  Positive for abdominal distention, abdominal pain, constipation and nausea. Negative for anal bleeding, blood in stool, diarrhea and vomiting.   Genitourinary: Negative.    Musculoskeletal: Negative.    Skin: Negative.    Neurological: Negative.    Psychiatric/Behavioral: Negative.         All other systems  negative unless otherwise stated in ROS or HPI above.       Esau Llanes MD  Internal Medicine       Call office with any questions or seek emergency care if necessary.   Patient understands and agrees to follow directions and advice.      ----------------------------------------- PATIENT INSTRUCTIONS-----------------------------------------     There are no Patient Instructions on file for this visit.        The 21st Century Cures Act makes medical notes available to patients in the interest of transparency.  However, please be advised that this is a medical document.  It is intended as a peer to peer communication.  It is written in medical language and may contain abbreviations or verbiage that are technical and unfamiliar.  It may appear blunt or direct.  Medical documents are intended to carry relevant information, facts as evident, and the clinical opinion of the practitioner.          [1]   Current Outpatient Medications   Medication Sig Dispense Refill    Omeprazole 40 MG Oral Capsule Delayed Release Take 1 capsule (40 mg total) by mouth daily. 30 capsule 0    famotidine (PEPCID) 20 MG Oral Tab Take 1 tablet (20 mg total) by mouth 2 (two) times daily as needed for Heartburn. 30 tablet 0   [2]   Allergies  Allergen Reactions    Caffeine ANXIETY   [3]   Social History  Socioeconomic History    Marital status: Single   Tobacco Use    Smoking status: Never     Passive exposure: Never    Smokeless tobacco: Never   Vaping Use    Vaping status: Former    Quit date: 3/20/2025   Substance and Sexual Activity    Alcohol use: Yes     Comment: social    Drug use: Never    Sexual activity: Yes     Partners: Male

## 2025-04-16 NOTE — PATIENT INSTRUCTIONS
Fertility Specialist:   The following are options for reproductive endocrinology consultations.     [facility] [place]  Dr. Ilda Anthony M.D., MSCI and Dr. Kelton Kerns M.D., M.P.H.  [facility] [address]  [phone]  [phone]     [facility]  Dr. John Coffey and Dr. Cedric Gaytan  [address] [place] [address]  Tel: [phone]  Fax: [phone]     IVF1  Maurisio Newman MD  [address] [place] [address]  Phone: [phone]

## 2025-04-16 NOTE — H&P
[facility] - Gastroenterology                                                                                                               Requesting physician or provider: No primary care provider on file.    Chief Complaint   Patient presents with    New Patient     A lot of stomach acid and had to go to the ER. Symptoms have decreased in the last two weeks but she has been experiencing a lot of constipation.      Van assisted with   HPI:   Yina Hollingsworth is a 30 year old year-old female with history of hirschprung's disease.   Hx of current symptoms: seen in the ER on April 1, 2025, for chest pain characterized by pressure in the chest and back, accompanied by palpitations and a sensation of choking. An EKG and chest X-ray performed in the ER were normal. A CT abdomen and pelvis with contrast showed a complex left ovarian cyst, unchanged from prior imaging, and a tubular cystic structure in the right hemipelvis likely representing a bladder diverticulum. Her CMP, CBC, and D-dimer were unremarkable.  She has a history of Hirschsprung's disease with multiple surgeries from [age] months to [age] years old, including a right hemicolectomy. Constipation began three days after her ER visit. She is taking magnesium, which helps with constipation, but is unsure if she should continue it. She has tried Dulcolax, taking only one pill due to concerns about self-prescribing. She includes vegetables in her diet, walks daily, and drinks plenty of water, but still experiences constipation. She reports left-sided abdominal pain when unable to have a bowel movement.     Currently pt c/o increased pain in left mid abdomen; states that pain occurs in spasms 5/10. Increased spasms when pt gets hungry; pt currently experiencing pain and she just had breakfast. This morning pt had feeling of trapped air and pressure in back, similar symptoms have been occurring since being of April. Pt  started taking omeprazole in the beginning of April, states that it has not helped with symptoms therefore she started taking pepcid which helps with symptom management. Additional symptoms include gas, bloating, loss of appetite, weight loss of 8kg since the beginning of April.  Reports bm every 20 minutes for 3 hrs when taking magnesium-powder. If she does not taking magnesium pt has constipation described as not having a bm for 1-1.5 days. Results in increased lower abdominal pain, heaviness in lower abdomen, nausea.     Pt is very concerned about being constipated since pt had hirschsprung's she does not want to cause complications of the disease.     Denies: melena, hematochezia, hematemesis, abd surgery, N/V/D    Prior endoscopies:  Egd and colonoscopy as a child.     Soc:  -denies smoking  -occasional Etoh    Wt Readings from Last 6 Encounters:   04/17/25 166 lb (75.3 kg)   04/16/25 164 lb (74.4 kg)   04/08/25 164 lb (74.4 kg)   04/01/25 176 lb 5.9 oz (80 kg)        History, Medications, Allergies, ROS:      Past Medical History[1]   Past Surgical History[2]   Family Hx: Family History[3]   Social History: Short Social Hx on File[4]     Medications (Active prior to today's visit):  Current Medications[5]    Allergies:  Allergies[6]    ROS:   CONSTITUTIONAL:  negative for fevers, rigors  EYES:  negative for diplopia   RESPIRATORY:  negative for severe shortness of breath  CARDIOVASCULAR:  negative for crushing sub-sternal chest pain  GASTROINTESTINAL:  see HPI  GENITOURINARY:  negative for dysuria or gross hematuria  INTEGUMENT/BREAST:  SKIN:  negative for jaundice   ALLERGIC/IMMUNOLOGIC:  negative for hay fever  ENDOCRINE:  negative for cold intolerance and heat intolerance  MUSCULOSKELETAL:  negative for joint effusion/severe erythema  BEHAVIOR/PSYCH:  negative for psychotic behavior      PHYSICAL EXAM:   Blood pressure 115/74, pulse 77, height 5' 4.57\" (1.64 m), weight 166 lb (75.3 kg), last menstrual  period 04/02/2025.    Gen- Patient appears comfortable and in no acute discomfort  HEENT: the sclera appears anicteric, oropharynx clear, mucus membranes appear moist  CV- regular rate and rhythm, the extremities are warm and well perfused   Lung- Moves air well; No labored breathing  Abdomen- soft, non-tender exam in all quadrants without rigidity or guarding, non-distended, no abnormal bowel sounds noted, no masses are palpated  Skin- No jaundice  Ext: no cyanosis, clubbing or edema is evident.   Neuro- Alert and interactive, and gross movements of extremities normal  Psych - appropriate, non-agitated    Labs/Imaging:     Patient's pertinent labs and imaging were reviewed and discussed with patient today.      Lab Results   Component Value Date    GLU 86 04/16/2025    GLU 99 04/01/2025     04/16/2025     04/01/2025    K 3.7 04/16/2025    K 3.7 04/01/2025     04/16/2025     04/01/2025    CO2 28.0 04/16/2025    CO2 25.0 04/01/2025    ANIONGAP 9 04/16/2025    ANIONGAP 9 04/01/2025    BUN 10 04/16/2025    BUN 10 04/01/2025    CREATSERUM 0.72 04/16/2025    CREATSERUM 0.71 04/01/2025    BUNCREA 13.9 04/16/2025    BUNCREA 14.1 04/01/2025    CA 9.9 04/16/2025    CA 9.1 04/01/2025    OSMOCALC 290 04/16/2025    OSMOCALC 287 04/01/2025    EGFRCR 117 04/16/2025    EGFRCR 119 04/01/2025    EGFRCR 125 03/28/2025    ALT 20 04/16/2025    ALT 38 04/01/2025    AST 16 04/16/2025    AST 27 04/01/2025    ALKPHO 49 04/16/2025    ALKPHO 51 04/01/2025    BILT 0.9 04/16/2025    BILT 0.6 04/01/2025    TP 7.2 04/16/2025    TP 7.2 04/01/2025    ALB 4.9 (H) 04/16/2025    ALB 4.6 04/01/2025    GLOBULIN 2.3 04/16/2025    GLOBULIN 2.6 04/01/2025    ELECTAG 2.1 (H) 04/16/2025    ELECTAG 1.8 04/01/2025    FASTING No 04/16/2025    FASTING No 04/16/2025       Lab Results   Component Value Date    RBC 4.25 04/16/2025    RBC 4.54 04/01/2025    HGB 12.9 04/16/2025    HGB 13.6 04/01/2025    HCT 38.3 04/16/2025    HCT 40.5  04/01/2025    MCV 90.1 04/16/2025    MCV 89.2 04/01/2025    MCV 86.7 03/28/2025    MCH 30.4 04/16/2025    MCH 30.0 04/01/2025    MCHC 33.7 04/16/2025    MCHC 33.6 04/01/2025    MCHC 33.9 03/28/2025    RDW 12.2 04/16/2025    RDW 12.1 04/01/2025    NEPRELIM 3.67 04/16/2025    NEPRELIM 5.35 04/01/2025    WBC 6.7 04/16/2025    WBC 7.9 04/01/2025    .0 04/16/2025    .0 04/01/2025        CT abdomen/pelvis 4/1/2023  Impression   CONCLUSION:  1. No acute finding .  2. Complex cystic left ovarian lesion unchanged.  Gynecological consultation recommended.  3. Tubular cystic structure in the right hemipelvis is probably a bladder diverticulum.  4. Prior right hemicolectomy.  No obstruction.       ASSESSMENT/PLAN:   Yina Hollingsworth is a 30 year old year-old female with history of hirschprung's disease.   Hx of current symptoms: seen in the ER on April 1, 2025, for chest pain characterized by pressure in the chest and back, accompanied by palpitations and a sensation of choking. An EKG and chest X-ray performed in the ER were normal. A CT abdomen and pelvis with contrast showed a complex left ovarian cyst, unchanged from prior imaging, and a tubular cystic structure in the right hemipelvis likely representing a bladder diverticulum. Her CMP, CBC, and D-dimer were unremarkable.  She has a history of Hirschsprung's disease with multiple surgeries from [age] months to [age] years old, including a right hemicolectomy. Constipation began three days after her ER visit. She is taking magnesium, which helps with constipation, but is unsure if she should continue it. She has tried Dulcolax, taking only one pill due to concerns about self-prescribing. She includes vegetables in her diet, walks daily, and drinks plenty of water, but still experiences constipation. She reports left-sided abdominal pain when unable to have a bowel movement.     Currently pt c/o increased pain in left mid abdomen; states that pain occurs in  spasms 5/10. Increased spasms when pt gets hungry; pt currently experiencing pain and she just had breakfast. This morning pt had feeling of trapped air and pressure in back, similar symptoms have been occurring since being of April. Pt started taking omeprazole in the beginning of April, states that it has not helped with symptoms therefore she started taking pepcid which helps with symptom management. Additional symptoms include gas, bloating, loss of appetite, weight loss of 8kg since the beginning of April.  Reports bm every 20 minutes for 3 hrs when taking magnesium-powder. If she does not taking magnesium pt has constipation described as not having a bm for 1-1.5 days. Results in increased lower abdominal pain, heaviness in lower abdomen, nausea.     Pt is very concerned about being constipated since pt had hirschsprung's she does not want to cause complications of the disease.     Denies: melena, hematochezia, hematemesis, abd surgery, N/V/D    1. Weight loss    2. Irregular bowel habits    3. Constipation, unspecified constipation type    4. History of Hirschsprung's disease    5. Lower abdominal pain    6. Gas bloat syndrome  Consider weight loss of 16lbs (8kg) in 1 month per pt as well as pts high anxiety level of something being wrong with with her colon. Recommending bidirectional endoscopy at this time.     Ddx gastritis, gerd, diverticular disease, constipation, IBS, ibd    Recommendations:    # weight loss  - suggested bidirectional endoscopy  - high fiber diet, high protein    # constipation, change in bowel  - start miralax bid  - increase fiber and fluid in take  - continue light exercise     # gas/bloat, left sided abdominal pain  - continue pepcid  - avoid fatty greasy foods    1. Schedule colonoscopy/egd with MAC w/ URGENT pool MD [Diagnosis: weight loss, change in bowel, constipation]    2.  bowel prep from pharmacy: Prep: Trilyte Prep    Colonoscopy consent: I have discussed the risks,  benefits, and alternatives to colonoscopy with the patient [who demonstrated understanding], including but not limited to the risks of bleeding, infection, pain, death, as well as the risks of anesthesia and perforation all leading to prolonged hospitalization, surgical intervention, or even death. I also specifically mentioned the miss rate of colonoscopy of 5-10% in the best of all circumstances. All questions were answered to the patient’s satisfaction. The patient signed informed consent and elected to proceed with colonoscopy with intervention [i.e. polypectomy, stent placement, etc.] as indicated.         Orders This Visit:  No orders of the defined types were placed in this encounter.      Meds This Visit:  Requested Prescriptions     Signed Prescriptions Disp Refills    polyethylene glycol, PEG 3350, 17 GM/SCOOP Oral Powder 238 g 0     Sig: Take 17 g by mouth daily.    polyethylene glycol, PEG 3350-KCl-NaBcb-NaCl-NaSulf, 236 g Oral Recon Soln 4000 mL 0     Sig: Take 4,000 mL by mouth once for 1 dose. As directed by GI clinic instructions.       Imaging & Referrals:  None         JYOTHI Knutson   4/17/2025          [1] History reviewed. No pertinent past medical history.  [2]   Past Surgical History:  Procedure Laterality Date    Colon surgery      Tonsillectomy     [3] History reviewed. No pertinent family history.  [4]   Social History  Socioeconomic History    Marital status: Single   Tobacco Use    Smoking status: Never     Passive exposure: Never    Smokeless tobacco: Never   Vaping Use    Vaping status: Former    Quit date: 3/20/2025   Substance and Sexual Activity    Alcohol use: Yes     Comment: social    Drug use: Yes     Comment: CBD to sleep    Sexual activity: Yes     Partners: Male   [5]   Current Outpatient Medications   Medication Sig Dispense Refill    polyethylene glycol, PEG 3350, 17 GM/SCOOP Oral Powder Take 17 g by mouth daily. 238 g 0    polyethylene glycol, PEG  3350-KCl-NaBcb-NaCl-NaSulf, 236 g Oral Recon Soln Take 4,000 mL by mouth once for 1 dose. As directed by GI clinic instructions. 4000 mL 0    bisacodyl (DULCOLAX) 10 MG Rectal Suppos Place 1 suppository (10 mg total) rectally daily. 30 suppository 0    polyethylene glycol, PEG 3350, 17 GM/SCOOP Oral Powder Take 17 g by mouth daily. 238 g 3    MAGNESIUM CITRATE OR Take 1 capsule by mouth in the morning.      famotidine (PEPCID) 20 MG Oral Tab Take 1 tablet (20 mg total) by mouth 2 (two) times daily as needed for Heartburn. 30 tablet 0    Omeprazole 40 MG Oral Capsule Delayed Release Take 1 capsule (40 mg total) by mouth daily. (Patient not taking: Reported on 4/17/2025) 30 capsule 0   [6]   Allergies  Allergen Reactions    Caffeine ANXIETY

## 2025-04-17 ENCOUNTER — TELEPHONE (OUTPATIENT)
Facility: CLINIC | Age: 30
End: 2025-04-17

## 2025-04-17 ENCOUNTER — LAB ENCOUNTER (OUTPATIENT)
Dept: LAB | Age: 30
End: 2025-04-17
Attending: STUDENT IN AN ORGANIZED HEALTH CARE EDUCATION/TRAINING PROGRAM
Payer: COMMERCIAL

## 2025-04-17 ENCOUNTER — OFFICE VISIT (OUTPATIENT)
Facility: CLINIC | Age: 30
End: 2025-04-17

## 2025-04-17 VITALS
DIASTOLIC BLOOD PRESSURE: 74 MMHG | SYSTOLIC BLOOD PRESSURE: 115 MMHG | WEIGHT: 166 LBS | HEIGHT: 64.57 IN | HEART RATE: 77 BPM | BODY MASS INDEX: 28 KG/M2

## 2025-04-17 DIAGNOSIS — R10.30 LOWER ABDOMINAL PAIN: ICD-10-CM

## 2025-04-17 DIAGNOSIS — R63.4 WEIGHT LOSS: Primary | ICD-10-CM

## 2025-04-17 DIAGNOSIS — K92.89 GAS BLOAT SYNDROME: ICD-10-CM

## 2025-04-17 DIAGNOSIS — K59.00 CONSTIPATION, UNSPECIFIED CONSTIPATION TYPE: ICD-10-CM

## 2025-04-17 DIAGNOSIS — R19.8 IRREGULAR BOWEL HABITS: ICD-10-CM

## 2025-04-17 DIAGNOSIS — R19.4 CHANGE IN BOWEL HABITS: ICD-10-CM

## 2025-04-17 DIAGNOSIS — Z87.738 HISTORY OF HIRSCHSPRUNG'S DISEASE: ICD-10-CM

## 2025-04-17 DIAGNOSIS — R63.4 LOSS OF WEIGHT: Primary | ICD-10-CM

## 2025-04-17 DIAGNOSIS — R10.13 EPIGASTRIC PAIN: ICD-10-CM

## 2025-04-17 LAB
ATRIAL RATE: 66 BPM
P AXIS: 48 DEGREES
P-R INTERVAL: 154 MS
Q-T INTERVAL: 396 MS
QRS DURATION: 86 MS
QTC CALCULATION (BEZET): 415 MS
R AXIS: 26 DEGREES
T AXIS: 29 DEGREES
VENTRICULAR RATE: 66 BPM

## 2025-04-17 PROCEDURE — 99204 OFFICE O/P NEW MOD 45 MIN: CPT

## 2025-04-17 PROCEDURE — 3078F DIAST BP <80 MM HG: CPT

## 2025-04-17 PROCEDURE — 87338 HPYLORI STOOL AG IA: CPT

## 2025-04-17 PROCEDURE — 3074F SYST BP LT 130 MM HG: CPT

## 2025-04-17 PROCEDURE — 3008F BODY MASS INDEX DOCD: CPT

## 2025-04-17 RX ORDER — POLYETHYLENE GLYCOL 3350 17 G/17G
17 POWDER, FOR SOLUTION ORAL DAILY
Qty: 238 G | Refills: 0 | Status: SHIPPED | OUTPATIENT
Start: 2025-04-17

## 2025-04-17 NOTE — TELEPHONE ENCOUNTER
Scheduled for:  Colonoscopy (44323) EGD(85114)  Provider Name:  Dr. Philippe  Date:  5/16/2025  Location:  [facility]  Sedation:  MAC  Time:  (pt is aware that [facility] will call the day before to confirm arrival time)   Prep:  Trilyte  Meds/Allergies Reconciled?:  Provider Reviewed     Diagnosis with codes:  weight loss (R63.4), change in bowel (R19.8), constipation (K59.00)  Was patient informed to call insurance with codes (Y/N):  Yes      Referral sent?:  Referral was sent at the time of electronic surgical scheduling.   [facility] or [facility] notified?:  Yes     Medication Orders:  None   Misc Orders:  None      Further instructions given by staff:   Patient is aware to NOT take iron pills, herbal meds and diet supplements for 7 days before exam. Also to NOT take any form of alcohol, recreational drugs and any forms of ED meds 24-72 hours before exam.

## 2025-04-17 NOTE — PATIENT INSTRUCTIONS
# weight loss  - suggested bidirectional endoscopy  - high fiber diet, high protein    # constipation, change in bowel  - start miralax bid  - increase fiber and fluid in take  - continue light exercise     # gas/bloat, left sided abdominal pain  - continue pepcid  - avoid fatty greasy foods    1. Schedule colonoscopy/egd with MAC w/ URGENT pool MD [Diagnosis: weight loss, change in bowel, constipation]    2.  bowel prep from pharmacy: Prep: Trilyte Prep       For cardiology patients and patients on blood thinners:  Please contact your cardiology clinic for clearance to proceed with the endoscopic procedure. If you are on blood thinners, please also confirm with your cardiologic clinic that you are able to hold the blood thinner per our recommendations.\"    BLOOD THINNER ORDERS:  -Hold for 48 hours (Xarelto, Eliquis, Pradaxa, Savaysa)  -Hold for 3 days (Pletal)  -Hold for 5 days (Coumadin, Plavix, Brilinta, Aggrenox)  -Hold for 7 days (Effient)     For endocrinology insulin patients:    Please contact your endocrinology clinic for insulin adjustment orders prior to your endoscopic procedure.    4. Read all bowel prep instructions carefully    5. AVOID seeds, nuts, popcorn, raw fruits and vegetables (cooked is okay) for 5 days before procedure    6.   If you start any NEW medication after your visit today, please notify us. Certain medications will need to be held before the procedure, or the procedure cannot be performed.     >>>Please note: if you were prescribed Suprep for the bowel prep and it is too expensive or not covered by insurance, it is okay to substitute Trilyte (or any similar generic prep). This can be done by notifying the pharmacy or calling our office.     ENDOSCOPIC RISK BENEFIT DISCUSSION: I described the procedure in great detail with the patient. I discussed the risks and benefits, including but not limited to: bleeding, perforation, infection, anesthesia complications, and even death.  Patient will be NPO after midnight and will have a person physically present at time of pick-up to drive patient home. Patient verbalized understanding and agrees to proceed with procedure as planned.

## 2025-04-20 LAB — H PYLORI AG STL QL IA: NEGATIVE

## 2025-04-20 NOTE — PROGRESS NOTES
Please relay to patient if not read:       Hello There!     Dr. Llanes here, I have reviewed your labs here are your recommendations:    # Lipids/cholesterol: Your lipids are well controlled at this time, slight abnormalities are ok and are diet related. Heart disease risk scoring, ie ASCVD, typically starts around age 40 and increases with age. I will address this with you if the ASCVD reaches greater than 5% to discuss preventive options. Please continue with exercise and healthy diet, such as the mediterranean diet.     The ASCVD Risk score (Rashi LANIER, et al., 2019) failed to calculate for the following reasons:    The 2019 ASCVD risk score is only valid for ages 40 to 79    # Diabetes/A1C: Your A1C Last A1c value was 5.3% done 4/16/2025.   which shows  no diabetes. We will recheck this value yearly, sooner if clinically indicated.     # TSH: Your thyroid (TSH) function is normal.     # CMP: Your comprehensive metabolic panel shows overall stable functioning kidneys (creatinine, GFR), liver (AST, ALT, Bilirubin), and electrolytes (sodium, potassium, calcium). Slight variations in other values such as BUN/Creat, Serum Osm, anion gap, chloride, etc are not of clinical value at this time.     # CBC: Your complete blood count shows overall stable red blood cells, white blood cells, platelets (help you stop bleeding), and hematocrit (thickness of blood),  Slight variations in other values such as RDW/sw, MCH are not of clinical value at this time.     #Anemia Screening:  Your Iron Panel Shows signs of Iron Deficiency anemia. Please purchase over the counter (Over the counter) from any pharmacy/[employer], start taking Ferrous Sulfate 325mg one pill daily.      # Vitamins: Your vitamin D level is Normal Vitamin D (>30ng/mL) If you are already on Vitamin supplementation please continue current dose. Typically Maintenance level is 600 International units daily for patients under the age of 70, and if above 70 years old 800  international units daily    Your folic acid and Vitamin B12 level is also normal        If you have any questions or concerns in regards to these labs please schedule a follow up and will gladly discuss.   -Dr. Llanes

## 2025-04-20 NOTE — PROGRESS NOTES
Please relay to patient if not read:     Hello, I reviewed your EKG:  shows Normal Sinus rhythm. It is a good baseline to have documented should there be any medical changes for future reference

## 2025-04-21 NOTE — PROGRESS NOTES
Please relay to patient if not read:     Jessy Bran,   Your H pylori test is negative, please continue to take your omeprazole 40mg  daily and follow up with gastroenterologist on May 16th if symptoms still going on.  -Dr. Llanes

## 2025-05-09 RX ORDER — GARLIC EXTRACT 500 MG
1 CAPSULE ORAL DAILY
COMMUNITY

## 2025-05-09 RX ORDER — FERROUS SULFATE 325(65) MG
325 TABLET, DELAYED RELEASE (ENTERIC COATED) ORAL
COMMUNITY

## 2025-05-09 RX ORDER — PSYLLIUM HUSK (WITH SUGAR) 3 G/12 G
POWDER (GRAM) ORAL DAILY
COMMUNITY

## 2025-05-16 ENCOUNTER — ANESTHESIA EVENT (OUTPATIENT)
Dept: ENDOSCOPY | Facility: HOSPITAL | Age: 30
End: 2025-05-16
Payer: COMMERCIAL

## 2025-05-16 ENCOUNTER — HOSPITAL ENCOUNTER (OUTPATIENT)
Facility: HOSPITAL | Age: 30
Setting detail: HOSPITAL OUTPATIENT SURGERY
Discharge: HOME | End: 2025-05-16
Attending: INTERNAL MEDICINE | Admitting: INTERNAL MEDICINE
Payer: COMMERCIAL

## 2025-05-16 ENCOUNTER — ANESTHESIA (OUTPATIENT)
Dept: ENDOSCOPY | Facility: HOSPITAL | Age: 30
End: 2025-05-16
Payer: COMMERCIAL

## 2025-05-16 VITALS
RESPIRATION RATE: 21 BRPM | OXYGEN SATURATION: 100 % | WEIGHT: 165 LBS | BODY MASS INDEX: 27.49 KG/M2 | DIASTOLIC BLOOD PRESSURE: 71 MMHG | HEART RATE: 64 BPM | SYSTOLIC BLOOD PRESSURE: 107 MMHG | HEIGHT: 64.96 IN

## 2025-05-16 DIAGNOSIS — R63.4 LOSS OF WEIGHT: ICD-10-CM

## 2025-05-16 DIAGNOSIS — K59.00 CONSTIPATION, UNSPECIFIED CONSTIPATION TYPE: ICD-10-CM

## 2025-05-16 DIAGNOSIS — R19.4 CHANGE IN BOWEL HABITS: ICD-10-CM

## 2025-05-16 PROBLEM — K64.8 INTERNAL HEMORRHOIDS: Status: ACTIVE | Noted: 2025-05-16

## 2025-05-16 LAB — B-HCG UR QL: NEGATIVE

## 2025-05-16 PROCEDURE — 45378 DIAGNOSTIC COLONOSCOPY: CPT | Performed by: INTERNAL MEDICINE

## 2025-05-16 PROCEDURE — 43239 EGD BIOPSY SINGLE/MULTIPLE: CPT | Performed by: INTERNAL MEDICINE

## 2025-05-16 RX ORDER — SODIUM CHLORIDE, SODIUM LACTATE, POTASSIUM CHLORIDE, CALCIUM CHLORIDE 600; 310; 30; 20 MG/100ML; MG/100ML; MG/100ML; MG/100ML
INJECTION, SOLUTION INTRAVENOUS CONTINUOUS
Status: DISCONTINUED | OUTPATIENT
Start: 2025-05-16 | End: 2025-05-16

## 2025-05-16 RX ORDER — ONDANSETRON 2 MG/ML
4 INJECTION INTRAMUSCULAR; INTRAVENOUS ONCE AS NEEDED
Status: DISCONTINUED | OUTPATIENT
Start: 2025-05-16 | End: 2025-05-16

## 2025-05-16 RX ORDER — LIDOCAINE HYDROCHLORIDE 10 MG/ML
INJECTION, SOLUTION EPIDURAL; INFILTRATION; INTRACAUDAL; PERINEURAL AS NEEDED
Status: DISCONTINUED | OUTPATIENT
Start: 2025-05-16 | End: 2025-05-16 | Stop reason: SURG

## 2025-05-16 RX ORDER — NALOXONE HYDROCHLORIDE 0.4 MG/ML
0.08 INJECTION, SOLUTION INTRAMUSCULAR; INTRAVENOUS; SUBCUTANEOUS ONCE AS NEEDED
Status: DISCONTINUED | OUTPATIENT
Start: 2025-05-16 | End: 2025-05-16

## 2025-05-16 RX ADMIN — SODIUM CHLORIDE, SODIUM LACTATE, POTASSIUM CHLORIDE, CALCIUM CHLORIDE: 600; 310; 30; 20 INJECTION, SOLUTION INTRAVENOUS at 07:30:00

## 2025-05-16 RX ADMIN — LIDOCAINE HYDROCHLORIDE 40 MG: 10 INJECTION, SOLUTION EPIDURAL; INFILTRATION; INTRACAUDAL; PERINEURAL at 07:59:00

## 2025-05-16 NOTE — OPERATIVE REPORT
[facility]    EGD AND COLONOSCOPY PROCEDURE REPORTS:    DATE OF PROCEDURE:  5/16/2025    PCP: Esau Llanes MD     PREOPERATIVE DIAGNOSIS:  Atypical chest pain, dyspepsia, epigastric abdominal pain, left-sided abdominal pain, loss of appetite abnormal weight loss; change in bowel habits     POSTOPERATIVE DIAGNOSIS:  See impression.     SURGEON:  John Philippe M.D.     SEDATION:    MAC anesthesia provided by the Anesthesia Service.  MAC anesthesia requested due to age 30, anticipated intolerance of the EGD and colonoscopy examination under safe doses conscious sedation medications    Estimated blood loss: none/insignificant       EGD PROCEDURE:    After the nature and risks of EGD and colonoscopy examinations under MAC anesthesia were discussed with the patient and her mother and all questions answered, informed consent was obtained.  The patient was sedated as above.      Once sedated, the Olympus adult diagnostic gastroscope was placed in the patient's mouth and advanced under direct visualization through the oropharynx into the esophagus, on down through the stomach and pylorus to the duodenal bulb and second portions of the duodenum.  Retroflexion was performed in the stomach.     EGD FINDINGS:    Esophagus and GE junction: Healthy normal esophagus down to the GE junction and Z-line.  Shiny smooth esophageal mucosa with normal light reflex.  No inflammatory changes.    No hiatal hernia on forward or retroflexed views.    Stomach: Clear secretions present.  Mild patchy gastric mucosal erythema and edema; random gastric mucosal biopsies obtained.    Duodenum: Clear secretions present. Normal duodenal bulb and second third portions duodenum; random duodenal mucosal biopsies obtained.    COLONOSCOPY PROCEDURE:    The patient was repositioned for colonoscopy examination.  Additional sedation given.  Digital rectal exam was performed, which showed no masses.  The [employer] pediatric video  colonoscope was advanced under direct visualization through the entire length of the colonic remnant to the cecum and terminal ileum.  Retroflex exam performed up the ascending colon to the hepatic flexure.  Cecum was confirmed by landmarks, including appendiceal orifice, cecal trifold, ileocecal valve.  Retroflexion was performed in the rectum.    The quality of the prep was excellent.     COLONOSCOPY FINDINGS:  Left hemicolectomy anatomy with an end to side anastomosis immediately above the anal canal, photographed; then approximately 40 cm length of colon (fully insufflated) up to a normal and intact cecum and terminal ileum.  Photographs taken.  No colon polyp, neoplasm, obstructing process seen.  Small internal hemorrhoids.     IMPRESSION:  Reassuring EGD examination today in this patient with recent atypical chest pain, choking sensation, loss of appetite.  Random gastric and duodenal mucosal biopsies obtained.  Post surgical colonic anatomy with approximately 40 cm colonic remnant, intact cecum and ileocecal valve.  Small internal hemorrhoids.     RECOMMENDATIONS:  Followup above gastric and duodenal mucosal biopsy results.  Continue to trial safe effective daily bowel regimen for recent constipation.  Reassuring exam today.  High-fiber diet.  Continue to follow-up with our office.

## 2025-05-16 NOTE — ANESTHESIA PREPROCEDURE EVALUATION
Anesthesia PreOp Note    HPI:     Yina Hollingsworth is a 30 year old female who presents for preoperative consultation requested by: John Philippe MD    Date of Surgery: 5/16/2025    Procedure(s):  COLONOSCOPY/ ESOPHAGOGASTRODUODENOSCOPY  ESOPHAGOGASTRODUODENOSCOPY (EGD)  Indication: Loss of weight/ Constipation, unspecified constipation type / Change in bowel habits    Relevant Problems   No relevant active problems       NPO:  Last Liquid Consumption Date: 05/15/25  Last Liquid Consumption Time: 2315  Last Solid Consumption Date: 05/14/25  Last Solid Consumption Time: 1800  Last Liquid Consumption Date: 05/15/25          History Review:  Patient Active Problem List    Diagnosis Date Noted    History of Hirschsprung disease 04/16/2025    Gastroesophageal reflux disease without esophagitis 04/16/2025    Cyst of left ovary 04/16/2025    H/O right hemicolectomy 04/16/2025       Past Medical History[1]    Past Surgical History[2]    Prescriptions Prior to Admission[3]  Current Medications and Prescriptions Ordered in Epic[4]    Allergies[5]    Family History[6]  Social Hx on file[7]    Available pre-op labs reviewed.  Lab Results   Component Value Date    WBC 6.7 04/16/2025    RBC 4.25 04/16/2025    HGB 12.9 04/16/2025    HCT 38.3 04/16/2025    MCV 90.1 04/16/2025    MCH 30.4 04/16/2025    MCHC 33.7 04/16/2025    RDW 12.2 04/16/2025    .0 04/16/2025    URINEPREG Negative 05/16/2025     Lab Results   Component Value Date     04/16/2025    K 3.7 04/16/2025     04/16/2025    CO2 28.0 04/16/2025    BUN 10 04/16/2025    CREATSERUM 0.72 04/16/2025    GLU 86 04/16/2025    CA 9.9 04/16/2025          Vital Signs:  Body mass index is 27.49 kg/m².   height is 1.65 m (5' 4.96\") and weight is 74.8 kg (165 lb).   Vitals:    05/09/25 1434   Weight: 74.8 kg (165 lb)   Height: 1.65 m (5' 4.96\")        Anesthesia Evaluation     Patient summary reviewed    Airway   Mallampati: II  TM distance: >3  FB  Neck ROM: full  Dental - Dentition appears grossly intact     Pulmonary - negative ROS    breath sounds clear to auscultation  Cardiovascular - negative ROS  Exercise tolerance: good    ECG reviewed  Rhythm: regular  Rate: normal    Neuro/Psych - negative ROS     GI/Hepatic/Renal    (+) GERD well controlled    Endo/Other - negative ROS   Abdominal  - normal exam                 Anesthesia Plan:   ASA:  1  Plan:   MAC  Informed Consent Plan and Risks Discussed With:  Patient      I have informed Yina Hollingsworth and/or legal guardian or family member of the nature of the anesthetic plan, benefits, risks including possible dental damage if relevant, major complications, and any alternative forms of anesthetic management.   All of the patient's questions were answered to the best of my ability. The patient desires the anesthetic management as planned.  Mauri Chambers MD  2025 7:16 AM  Present on Admission:  **None**           [1]   Past Medical History:   History of Hirschsprung disease    Visual impairment    glasses   [2]   Past Surgical History:  Procedure Laterality Date    Colon surgery      Tonsillectomy     [3]   Medications Prior to Admission   Medication Sig Dispense Refill Last Dose/Taking    acidophilus-pectin Oral Cap Take 1 capsule by mouth daily.   2025    Psyllium (FIBER) 28.3 % Oral Powder Take by mouth daily.   2025    ferrous sulfate 325 (65 FE) MG Oral Tab EC Take 1 tablet (325 mg total) by mouth daily with breakfast.   2025    [] polyethylene glycol, PEG 3350-KCl-NaBcb-NaCl-NaSulf, 236 g Oral Recon Soln Take 4,000 mL by mouth once for 1 dose. As directed by GI clinic instructions. 4000 mL 0 Taking    MAGNESIUM CITRATE OR Take 1 capsule by mouth 2 (two) times daily as needed.   2025    [] Omeprazole 40 MG Oral Capsule Delayed Release Take 1 capsule (40 mg total) by mouth daily. 30 capsule 0 Taking   [4]   Current Facility-Administered Medications  Ordered in Epic   Medication Dose Route Frequency Provider Last Rate Last Admin    lactated ringers infusion   Intravenous Continuous John Philippe MD         No current [facility]-ordered outpatient medications on file.   [5]   Allergies  Allergen Reactions    Caffeine ANXIETY   [6] History reviewed. No pertinent family history.  [7]   Social History  Socioeconomic History    Marital status: Single   Tobacco Use    Smoking status: Never     Passive exposure: Never    Smokeless tobacco: Never   Vaping Use    Vaping status: Former    Quit date: 3/20/2025   Substance and Sexual Activity    Alcohol use: Not Currently    Drug use: Not Currently     Types: Cannabis     Comment: CBD to sleep    Sexual activity: Yes     Partners: Male

## 2025-05-16 NOTE — DISCHARGE INSTRUCTIONS
Notes from Dr Philippe:  Very healthy stomach endoscopy examination today.  Your esophagus looks normal despite the recent chest pains and choking.  I did not see anything wrong with your esophagus today.  Very very good news.  There was mild inflammation or gastritis in your stomach.  This was very mild.  I took some samples or biopsies of the lining of your stomach and the lining of your small intestine to evaluate your symptoms.  There is nothing serious going on to cause the recent weight loss.  This was probably from stress and emotions.  Today's colonoscopy examination went very well.  You have about 40 cm or 15 inches remaining of your colon or large intestine.  You should do well taking a safe daily laxative such as the fiber powders or MiraLAX or magnesium pills/milk of magnesia.  Please continue to follow with Dr. Katia Perez of our office for your symptoms.  .    Home Care Instructions for Colonoscopy and Gastroscopy with Sedation    Diet:  - Resume your regular diet as tolerated unless otherwise instructed.  - Start with light meals to minimize bloating.  - Do not drink alcohol today.    Medication:  - If you have questions about resuming your normal medications, please contact your Primary Care Physician.    Activities:  - Take it easy today. Do not return to work today.  - Do not drive today.  - Do not operate any machinery today (including kitchen equipment).    Colonoscopy:  - You may notice some rectal \"spotting\" (a little blood on the toilet tissue) for a day or two after the exam. This is normal.  - If you experience any rectal bleeding (not spotting), persistent tenderness or sharp severe abdominal pains, oral temperature over 100 degrees Fahrenheit, light-headedness or dizziness, or any other problems, contact your doctor.    Gastroscopy:  - You may have a sore throat for 2-3 days following the exam. This is normal. Gargling with warm salt water (1/2 tsp salt to 1 glass warm water) or using  throat lozenges will help.  - If you experience any sharp pain in your neck, abdomen or chest, vomiting of blood, oral temperature over 100 degrees Fahrenheit, light-headedness or dizziness, or any other problems, contact your doctor.    **If unable to reach your doctor, please go to the [facility] Emergency Room**    - Your referring physician will receive a full report of your examination.  - If you do not hear from your doctor's office within two weeks of your biopsy, please call them for your results.    You may be able to see your laboratory results in [email or web address] between 4 and 7 business days.  In some cases, your physician may not have viewed the results before they are released to [employer].  If you have questions regarding your results contact the physician who ordered the test/exam by phone or via [email or web address] by choosing \"Ask a Medical Question.\"

## 2025-05-16 NOTE — ANESTHESIA POSTPROCEDURE EVALUATION
Patient: Yina Hollingsworth    Procedure Summary       Date: 05/16/25 Room / Location: [facility] ENDOSCOPY 05 / [facility] ENDOSCOPY; [facility]    Anesthesia Start: 0754 Anesthesia Stop: 0827    Procedures:       COLONOSCOPY/ ESOPHAGOGASTRODUODENOSCOPY      ESOPHAGOGASTRODUODENOSCOPY (EGD)      PROCEDURE MAC Diagnosis:       Loss of weight      Constipation, unspecified constipation type      Change in bowel habits      (Gastritis, surgical anastomosis)    Scheduled Providers: John Philippe MD Anesthesiologist: Mauri Chambers MD    Anesthesia Type: MAC ASA Status: 1            Anesthesia Type: MAC    Vitals Value Taken Time   BP 91/67 05/16/25 08:35   Temp 37 05/16/25 08:39   Pulse 65 05/16/25 08:38   Resp 15 05/16/25 08:38   SpO2 100 % 05/16/25 08:38   Vitals shown include unfiled device data.    [facility] AN Post Evaluation:   Patient Evaluated in PACU  Patient Participation: complete - patient cannot participate  Level of Consciousness: sleepy but conscious and awake  Pain Score: 0  Pain Management: adequate  Airway Patency:patent  Dental exam unchanged from preop  Yes    Nausea/Vomiting: none  Cardiovascular Status: acceptable and hemodynamically stable  Respiratory Status: acceptable and nasal cannula  Postoperative Hydration stable      Mauri Chambers MD  5/16/2025 8:39 AM

## 2025-05-16 NOTE — H&P
History & Physical Examination    Patient Name: Yina Hollingsworth  MRN: [id number]  CSN: [id number]  YOB: 1995    Diagnosis: Atypical chest pain, dyspepsia, epigastric abdominal pain, left-sided abdominal pain, loss of appetite abnormal weight loss; change in bowel habits      PRESENT ILLNESS: 30-year-old fit healthy woman BMI 27.5 complex childhood history Hirschsprung's disease and apparently multiple surgeries during her first 6 years of life including partial colectomy.    She presents for EGD and colonoscopy examination for multiple upper GI and abdominal symptoms as above.      BMI Readings from Last 1 Encounters:   05/09/25 27.49 kg/m²         Prescriptions Prior to Admission[1]  Current Hospital Medications[2]    Allergies: Allergies[3]    Past Medical History[4]  Past Surgical History[5]  Family History[6]  Social History     Tobacco Use    Smoking status: Never     Passive exposure: Never    Smokeless tobacco: Never   Substance Use Topics    Alcohol use: Not Currently       SYSTEM Check if Review is Normal Check if Physical Exam is Normal If not normal, please explain:   HEENT [ ] [X ]    NECK & BACK [ ] [ ]    HEART [ X ] [ X ]    LUNGS [ X ] [ X ]    ABDOMEN [ X ] [ X ]    UROGENITAL [ ] [ ]    EXTREMITIES [ ] [ ]    OTHER        See Anesthesia documentation    [ x ] I have discussed the risks and benefits and alternatives with the patient/family.  They understand and agree to proceed with plan of care.  [ x ] I have reviewed the History and Physical done within the last 30 days.  Any changes noted above.    John Philippe MD  5/16/2025  7:44 AM             [1]   Medications Prior to Admission   Medication Sig Dispense Refill Last Dose/Taking    acidophilus-pectin Oral Cap Take 1 capsule by mouth daily.   5/11/2025    Psyllium (FIBER) 28.3 % Oral Powder Take by mouth daily.   5/11/2025    ferrous sulfate 325 (65 FE) MG Oral Tab EC Take 1 tablet (325 mg total) by mouth daily  with breakfast.   2025    [] polyethylene glycol, PEG 3350-KCl-NaBcb-NaCl-NaSulf, 236 g Oral Recon Soln Take 4,000 mL by mouth once for 1 dose. As directed by GI clinic instructions. 4000 mL 0 Taking    MAGNESIUM CITRATE OR Take 1 capsule by mouth 2 (two) times daily as needed.   2025    [] Omeprazole 40 MG Oral Capsule Delayed Release Take 1 capsule (40 mg total) by mouth daily. 30 capsule 0 Taking   [2]   Current Facility-Administered Medications   Medication Dose Route Frequency    lactated ringers infusion   Intravenous Continuous   [3]   Allergies  Allergen Reactions    Caffeine ANXIETY   [4]   Past Medical History:   History of Hirschsprung disease    Visual impairment    glasses   [5]   Past Surgical History:  Procedure Laterality Date    Colon surgery      Tonsillectomy     [6] History reviewed. No pertinent family history.

## (undated) DEVICE — Device

## (undated) DEVICE — KIT ENDO ORCAPOD 160/180/190

## (undated) DEVICE — STERILE LATEX POWDER-FREE SURGICAL GLOVESWITH NITRILE COATING: Brand: PROTEXIS

## (undated) DEVICE — V2 SPECIMEN COLLECTION MANIFOLD KIT: Brand: NEPTUNE

## (undated) DEVICE — MEDI-VAC NON-CONDUCTIVE SUCTION TUBING: Brand: CARDINAL HEALTH

## (undated) DEVICE — 60 ML SYRINGE REGULAR TIP: Brand: MONOJECT

## (undated) DEVICE — MEDI-VAC NON-CONDUCTIVE SUCTION TUBING 6MM X 1.8M (6FT.) L: Brand: CARDINAL HEALTH

## (undated) DEVICE — CONMED SCOPE SAVER BITE BLOCK, 20X27 MM: Brand: SCOPE SAVER

## (undated) DEVICE — KIT CLEAN ENDOKIT 1.1OZ GOWNX2

## (undated) DEVICE — YANKAUER,BULB TIP,W/O VENT,RIGID,STERILE: Brand: MEDLINE

## (undated) NOTE — LETTER
[facility]   Administracion de Anestesia  Yo, Yina Hollingsworth, acepto ser atendido por un anestesiólogo, quien está especialmente capacitado para monitorearme y darme medicamento para hacerme dormir o mantenerme cómodo heaven mi procedimiento.   Entiendo que mi anestesiólogo no es un empleado o agente de [facility] o Health Services. Él o collin trabaja para [facility]  Umang el paciente que solicita los servicios de anestesia, estoy de acuerdo con lo siguiente:  Permitir al anestesiólogo (médico de anestesia) que me suministre el medicamento y hacer los procedimientos adicionales que eun necesarios. Algunos ejemplos son: Al iniciar o utilizar miriam “IV” para suministrarme medicamentos, fluidos o bar heaven mi procedimiento, y colocarme miriam sonda de respiración, me ayudará a respirar cuando esté dormido (intubación). En el kasey de que mi corazón deje de funcionar correctamente, entiendo que mi anestesiólogo hará todo lo posible para salvar mi brian, a menos que los documentos de No resucitar de [facility] lo indiquen de otra manera.  Informar a mi anestesista antes del procedimiento:  Si estoy embarazada.  La última vez que comí o bebí algo.  Todos los medicamentos que diego (incluyendo medicamentos recetados, suplementos herbales y pastillas que puedo comprar sin receta médica (incluyendo drogas en la jules [medicamentos ilegales]). No informar a mi anestesiólogo acerca de estos medicamentos puede aumentar mi riesgo de complicaciones con la anestesia.  Si soy alérgico a cualquier cosa o he tenido previamente miriam reacción adversa a la anestesia.  Entiendo cómo la anestesia me ayudará (beneficios).  Entiendo que con cualquier tipo de anestesia hay riesgos:  Los riesgos más comunes son: náuseas, vómitos, dolor de garganta, dolor muscular, daño a los ojos, la boca o los dientes (por la colocación de la sonda de respiración).  Los riesgos poco comunes incluyen:  recordar lo que sucedió heaven mi procedimiento, reacciones alérgicas a los medicamentos, lesiones en las vías respiratorias, el corazón, los pulmones, la visión, los nervios o músculos, y en casos sumamente raros, la muerte.  Mii médico me ha explicado otras opciones de atención disponibles para mí (alternativas).  Pacientes embarazadas (“epidural”):    Entiendo que los riesgos de recibir miriam inyección epidural (medicamento que se aplica en la espalda para ayudar a controlar el dolor heaven el parto), incluyen picazón, presión arterial baja, dificultad para orinar, dolor de parker o disminución en el ritmo del corazón del bebé. Los riesgos muy poco comunes incluyen infección, hemorragia, convulsiones, ritmo cardíaco irregular y lesión del nervio.  Anestesia regional (“columna vertebral”, “epidural” y “bloqueo de los nervios”):  Entiendo que hay complicaciones poco frecuentes amish posibles, e incluyen dolor de parker, sangrado, infección, convulsiones, ritmo cardíaco irregular y la lesión del nervio.  .   Puedo cambiar de opinión acerca de recibir servicios de anestesia en cualquier momento antes de que se me administre el medicamento.         Paciente (o representante) Firma/Relación con el paciente  Fecha  Hora  Patient Signature/Signature of Responsible person Date Time           Nombre del intérprete (en larry kasey)  Idioma/Organización  Hora  Name of  Language/Organization Time          Firma del anestesiólogo Fecha  Hora  Signature of anesthesiologist Date Time    He hablado del procedimiento y la información anterior con el paciente (o el representante del paciente) y respondí yoandy preguntas. El paciente o larry representante ha aceptado recibir los servicios de anestesia.         Testigo Fecha  Hora  Witness Date Time  He verificado que la firma es la del paciente o del representante del paciente, y que se firmó antes del procedimiento.    Nombre del paciente: Yina Hollingsworth  Fec. de Nac.:  1/14/1995                 En letra de imprenta: May 13, 2025  Expediente médico No. [id number]                         Página 1 de 2  ----------ANESTHESIA CONSENT----------

## (undated) NOTE — LETTER
Patient Name: Yina Hollingsworth : 1995  Medical Record #: [id number] Printed: 2025  Page 1 of 2                                          [facility]  [address], [place], [place]  Autorización para operación y procedimiento quirúrgico                                                                                                      Por la presente, autorizo a John Philippe MD, mi médico y al asistente a realizar la operación/procedimiento quirúrgico a continuación, así andrei a administrar la anestesia que determine necesaria mi médico Nombre (s) de la operación/procedimiento: COLONOSCOPY/ ESOPHAGOGASTRODUODENOSCOPY en Yina Hollingsworth     Reconozco que heaven la operación/procedimiento quirúrgico, las condiciones imprevistas pueden requerir de procedimientos adicionales o diferentes a aquellos mencionados anteriormente.  Por lo tanto, autorizo y solicito además que el cirujano antes mencionado, los asistentes o las personas designadas realicen los procedimientos que, a larry juicio, eun necesarios y convenientes.    Mi cirujano/médico mosley discutido antes de mi cirugía los posibles beneficios, riesgos y efectos secundarios de flavio procedimiento, la probabilidad de alcanzar las metas y los posibles problemas que puedan ocurrir heaven la recuperación.  Ellos también tirado discutido las alternativas razonables al procedimiento, incluso los riesgos, beneficios y efectos secundarios relacionados con las alternativas y los riesgos relacionados con no realizar flavio procedimiento.  Tirado respondido a todas mis preguntas y confirmo que no se ha dado ninguna garantía en cuanto a los resultados que pueda obtener.    En kasey de que surja la necesidad heaven mi operación o heaven el periodo postoperatorio, también autorizo se aplique bar y/o productos sanguíneos.  Asimismo, entiendo que a pesar de las cuidadosas pruebas y análisis de bar o de los productos sanguíneos que  realizan las entidades recolectoras, todavía puedo estar sujeto a efectos adversos andrei resultado de recibir miriam transfusión de bar y/o productos sanguíneos.  A continuación se mencionan algunos, aunque no todos, los riesgos potenciales que pueden ocurrir: fiebre y reacciones alérgicas, reacciones hemolíticas, trasmisión de enfermedades andrei hepatitis, SIDA y citomegalovirus (CMV), así andrei sobrecarga de líquidos.   En kasey de que desee tener miriam transfusión autóloga de mi propia bar o miriam transfusión de un donante dirigido.  Lo discutiré con mi médico.  Check only if Refusing Blood or Blood Products  I understand refusal of blood or blood products as deemed necessary by my physician may have serious consequences to my condition to include possible death. I hereby assume responsibility for my refusal and release the hospital, its personnel, and my physicians from any responsibility for the consequences of my refusal.           o  Refuse       Autorizo el uso de cualquier muestra, órgano, tejido, parte del cuerpo u objeto extraño que pueda ser extraído de mi cuerpo heaven la operación/procedimiento para fines de diagnóstico, investigación o de enseñanza y larry desecho posterior por las autoridades del hospital.  También, autorizo la revelación de los resultados de las pruebas de muestras y/o los informes escritos al médico tratante andrei personal médico del hospital u otros médicos de referencia o consulta involucrados en mi atención, a discreción del patólogo o de mi médico tratante.    Doy consentimiento para que se fotografíen o graben vídeos de las operaciones o procedimientos a realizarse, incluidas las partes de mi cuerpo que eun adecuadas para propósitos médicos, científicos o educativos, en el entendido de que, mi identidad no sea revelada por las fotografías o por textos descriptivos que las acompañen.  Si el procedimiento es fotografiado o grabado en vídeo, el cirujano obtendrá la imagen, cinta de  vídeo o CD original.  El hospital no se hará responsable por el almacenamiento, la revelación o el mantenimiento de la imagen, cinta o CD.    Autorizo la presencia de un especialista de producto u observadores en el quirófano, según lo considere necesario mi médico o las personas que éste designe.     Reconozco que en kasey de que mi procedimiento resulte en un tiempo prolongado de radiografía/fluoroscopia, puedo desarrollar miriam reacción en la piel.    Si tengo miriam orden de No intentar la reanimación (AMARJIT), ruslan estado se suspenderá mientras esté en el quirófano, la oliva de procedimientos y heaven el periodo de recuperación a menos que yo indique lo contrario explícitamente (o miriam persona autorizada a melissa el consentimiento en mi nombre). El cirujano o mi médico tratante determinarán cuándo termina el periodo de recuperación aplicable a los efectos de restablecer la orden de AMARJIT.  Pacientes que se realizan un procedimiento de esterilización: Entiendo que si el procedimiento tiene éxito, los resultados serán permanentes y que, por lo tanto, me será imposible inseminar, concebir o tener hijos.  Asimismo, entiendo que el procedimiento tiene andrei propósito la esterilidad, aunque el resultado no está garantizado.   Admito que mi médico me ha explicado la aplicación de sedación/analgesia, incluidos los riesgos y beneficios y, consiento a la administración de sedación/analgesia conforme sea necesario o conveniente a juicio de mi médico.      CERTIFICO QUE HE LEÍDO Y COMPRENDIDO EL CONSENTIMIENTO ANTERIOR PARA LA OPERACIÓN y/o PROCEDIMIENTO.             ______________________________________  _______________________________  Firma del paciente      Firma de la persona responsible  Signature of patient      Signature of responsible person                        ___________________________________                                   Nombre en imprenta de la persona responsible         Name of responsible  person          ___________________________________                 Relación con el paciente         Relationship to patient    _________________________________________  ______________ ________________  Firma del testigo          Fecha / Date Hora / Time  Signature of witness    DECLARACÓN DEL MÉDICO Mediante mi firma al calce, afirmo que antes de la hora del procedimiento, he explicadoal paciente y/o a larry representante legal, los riesgos y beneficios involucrados en el tratamiento propuesto, así comocualquier alternativa razonable al tratamiento propuesto. También le(s) he explicado los riesgos y beneficios involucradosen el rechazo del tratarniento propuesto y alternativas al tratamiento propuesto, y he respondido a las preguntas del(la) paciente(My signature below affirms that prior to the time of the procedure, I have explained to the patient and/or his/her guardian, therisks and benefits involved in the proposed treatment and any reasonable alternative to the proposed treatment. I have alsoexplained the risks and benefits involved in refusal of the proposed treatment and have answered the patient's questions.)    ________________________________________   _________________________   _____________   (Firma del médico/Signature of Physician)                                    (Fecha/Date)                                             (Hora/Time)      Patient Name: Yina Hollingsworth     : 1995                 Printed: 2025      Medical Record #: [id number]                                              Page 2 of 2

## (undated) NOTE — LETTER
Date & Time: 5/21/2024, 2:44 PM  Patient: Yina Hollingsworth  Encounter Provider(s):    Angella Harden PA-C       To Whom It May Concern:    Yina Hollingsworth was seen and treated in our department on 5/21/2024. She should not return to work until 5/23/2024 .    If you have any questions or concerns, please do not hesitate to call.        _____________________________  Physician/APC Signature